# Patient Record
Sex: FEMALE | Race: WHITE | NOT HISPANIC OR LATINO | URBAN - METROPOLITAN AREA
[De-identification: names, ages, dates, MRNs, and addresses within clinical notes are randomized per-mention and may not be internally consistent; named-entity substitution may affect disease eponyms.]

---

## 2017-10-15 ENCOUNTER — INPATIENT (INPATIENT)
Facility: HOSPITAL | Age: 82
LOS: 8 days | Discharge: HOME | DRG: 85 | End: 2017-10-24
Attending: HOSPITALIST | Admitting: HOSPITALIST
Payer: MEDICARE

## 2017-10-15 VITALS
HEART RATE: 80 BPM | SYSTOLIC BLOOD PRESSURE: 140 MMHG | TEMPERATURE: 98 F | OXYGEN SATURATION: 99 % | RESPIRATION RATE: 20 BRPM | DIASTOLIC BLOOD PRESSURE: 69 MMHG

## 2017-10-15 LAB
ALBUMIN SERPL ELPH-MCNC: 4 G/DL — SIGNIFICANT CHANGE UP (ref 3.3–5)
ALP SERPL-CCNC: 76 U/L — SIGNIFICANT CHANGE UP (ref 40–120)
ALT FLD-CCNC: 14 U/L RC — SIGNIFICANT CHANGE UP (ref 10–45)
ANION GAP SERPL CALC-SCNC: 14 MMOL/L — SIGNIFICANT CHANGE UP (ref 5–17)
APTT BLD: 29.1 SEC — SIGNIFICANT CHANGE UP (ref 27.5–37.4)
AST SERPL-CCNC: 26 U/L — SIGNIFICANT CHANGE UP (ref 10–40)
BASOPHILS # BLD AUTO: 0 K/UL — SIGNIFICANT CHANGE UP (ref 0–0.2)
BASOPHILS NFR BLD AUTO: 0.5 % — SIGNIFICANT CHANGE UP (ref 0–2)
BILIRUB SERPL-MCNC: 0.2 MG/DL — SIGNIFICANT CHANGE UP (ref 0.2–1.2)
BUN SERPL-MCNC: 21 MG/DL — SIGNIFICANT CHANGE UP (ref 7–23)
CALCIUM SERPL-MCNC: 9.3 MG/DL — SIGNIFICANT CHANGE UP (ref 8.4–10.5)
CHLORIDE SERPL-SCNC: 99 MMOL/L — SIGNIFICANT CHANGE UP (ref 96–108)
CO2 SERPL-SCNC: 23 MMOL/L — SIGNIFICANT CHANGE UP (ref 22–31)
CREAT SERPL-MCNC: 0.79 MG/DL — SIGNIFICANT CHANGE UP (ref 0.5–1.3)
EOSINOPHIL # BLD AUTO: 0.2 K/UL — SIGNIFICANT CHANGE UP (ref 0–0.5)
EOSINOPHIL NFR BLD AUTO: 2.6 % — SIGNIFICANT CHANGE UP (ref 0–6)
GLUCOSE SERPL-MCNC: 128 MG/DL — HIGH (ref 70–99)
HCT VFR BLD CALC: 38.5 % — SIGNIFICANT CHANGE UP (ref 34.5–45)
HGB BLD-MCNC: 12.8 G/DL — SIGNIFICANT CHANGE UP (ref 11.5–15.5)
INR BLD: 1.07 RATIO — SIGNIFICANT CHANGE UP (ref 0.88–1.16)
LYMPHOCYTES # BLD AUTO: 1.6 K/UL — SIGNIFICANT CHANGE UP (ref 1–3.3)
LYMPHOCYTES # BLD AUTO: 24.6 % — SIGNIFICANT CHANGE UP (ref 13–44)
MCHC RBC-ENTMCNC: 33.3 GM/DL — SIGNIFICANT CHANGE UP (ref 32–36)
MCHC RBC-ENTMCNC: 34.6 PG — HIGH (ref 27–34)
MCV RBC AUTO: 104 FL — HIGH (ref 80–100)
MONOCYTES # BLD AUTO: 0.7 K/UL — SIGNIFICANT CHANGE UP (ref 0–0.9)
MONOCYTES NFR BLD AUTO: 10.6 % — SIGNIFICANT CHANGE UP (ref 2–14)
NEUTROPHILS # BLD AUTO: 4.1 K/UL — SIGNIFICANT CHANGE UP (ref 1.8–7.4)
NEUTROPHILS NFR BLD AUTO: 61.7 % — SIGNIFICANT CHANGE UP (ref 43–77)
PLATELET # BLD AUTO: 196 K/UL — SIGNIFICANT CHANGE UP (ref 150–400)
POTASSIUM SERPL-MCNC: 4 MMOL/L — SIGNIFICANT CHANGE UP (ref 3.5–5.3)
POTASSIUM SERPL-SCNC: 4 MMOL/L — SIGNIFICANT CHANGE UP (ref 3.5–5.3)
PROT SERPL-MCNC: 7.4 G/DL — SIGNIFICANT CHANGE UP (ref 6–8.3)
PROTHROM AB SERPL-ACNC: 11.6 SEC — SIGNIFICANT CHANGE UP (ref 9.8–12.7)
RBC # BLD: 3.7 M/UL — LOW (ref 3.8–5.2)
RBC # FLD: 12.2 % — SIGNIFICANT CHANGE UP (ref 10.3–14.5)
SODIUM SERPL-SCNC: 136 MMOL/L — SIGNIFICANT CHANGE UP (ref 135–145)
WBC # BLD: 6.7 K/UL — SIGNIFICANT CHANGE UP (ref 3.8–10.5)
WBC # FLD AUTO: 6.7 K/UL — SIGNIFICANT CHANGE UP (ref 3.8–10.5)

## 2017-10-15 PROCEDURE — 71010: CPT | Mod: 26

## 2017-10-15 PROCEDURE — 72125 CT NECK SPINE W/O DYE: CPT | Mod: 26

## 2017-10-15 PROCEDURE — 72170 X-RAY EXAM OF PELVIS: CPT | Mod: 26

## 2017-10-15 PROCEDURE — 70486 CT MAXILLOFACIAL W/O DYE: CPT | Mod: 26

## 2017-10-15 PROCEDURE — 99285 EMERGENCY DEPT VISIT HI MDM: CPT

## 2017-10-15 PROCEDURE — 70450 CT HEAD/BRAIN W/O DYE: CPT | Mod: 26

## 2017-10-15 PROCEDURE — 73030 X-RAY EXAM OF SHOULDER: CPT | Mod: 26,RT

## 2017-10-15 RX ORDER — ACETAMINOPHEN 500 MG
1000 TABLET ORAL ONCE
Qty: 0 | Refills: 0 | Status: COMPLETED | OUTPATIENT
Start: 2017-10-15 | End: 2017-10-15

## 2017-10-15 RX ORDER — TETANUS TOXOID, REDUCED DIPHTHERIA TOXOID AND ACELLULAR PERTUSSIS VACCINE, ADSORBED 5; 2.5; 8; 8; 2.5 [IU]/.5ML; [IU]/.5ML; UG/.5ML; UG/.5ML; UG/.5ML
0.5 SUSPENSION INTRAMUSCULAR ONCE
Qty: 0 | Refills: 0 | Status: COMPLETED | OUTPATIENT
Start: 2017-10-15 | End: 2017-10-15

## 2017-10-15 RX ORDER — OXYMETAZOLINE HYDROCHLORIDE 0.5 MG/ML
2 SPRAY NASAL ONCE
Qty: 0 | Refills: 0 | Status: COMPLETED | OUTPATIENT
Start: 2017-10-15 | End: 2017-10-15

## 2017-10-15 RX ADMIN — Medication 400 MILLIGRAM(S): at 18:56

## 2017-10-15 RX ADMIN — OXYMETAZOLINE HYDROCHLORIDE 2 SPRAY(S): 0.5 SPRAY NASAL at 16:20

## 2017-10-15 RX ADMIN — TETANUS TOXOID, REDUCED DIPHTHERIA TOXOID AND ACELLULAR PERTUSSIS VACCINE, ADSORBED 0.5 MILLILITER(S): 5; 2.5; 8; 8; 2.5 SUSPENSION INTRAMUSCULAR at 17:33

## 2017-10-15 NOTE — ED ADULT NURSE REASSESSMENT NOTE - NS ED NURSE REASSESS COMMENT FT1
pt ambulated to bathroom with assistance. no distress.
pt offered sling for R arm and placed by PA. pt repeatedly removing sling. educated on need and comfort care provided with sling. pt and family state understanding but state the sling is on the pts neck and makes pt uncomfortable. family currently going home.

## 2017-10-15 NOTE — ED PROVIDER NOTE - ATTENDING CONTRIBUTION TO CARE
89 y/o f with pmhx dementia, presents from [facility] for a fall earlier this evening. Witnessed by multiple people there. no LOC. Hit her head on right side. Also has pain on right shoulder.  no dizziness. no weakness.  Came by EMS, daughter arrived shortly after.   Gen. no acute distress, Non toxic   HEENT: EOMI, mmm, laceration approx 1.5 cm over right eye, bleeding from left nares.   Lungs: CTAB/L no C/ W /R   CVS: S1S2   Abd; Soft non tender, non distended   Ext: no edema   Neuro AAOx3 non focal clear speech

## 2017-10-15 NOTE — ED PROVIDER NOTE - MEDICAL DECISION MAKING DETAILS
AT[facility]: fall with head trauma, no loc,. concern for ich, fracture, check ct head ./ facial, check labs, re eval for dispo,.

## 2017-10-15 NOTE — ED PROVIDER NOTE - OBJECTIVE STATEMENT
89 y/o [facility] after a witnessed mechanical fall with headstrike. no loc. complaining of epistaxis. reports right shoulder pain. 89 y/o coming in [facility] after a witnessed mechanical fall with headstrike. no loc. complaining of epistaxis. reports right shoulder pain. on aspirin 81 mg daily. unsure of last tdap

## 2017-10-15 NOTE — CONSULT NOTE ADULT - ASSESSMENT
89F s/p mechanical fall with trace right insular traumatic SAH    -No acute neurosurgical intervention indicated at this time  -Pain control  -Repeat CT head at 10:00pm  -If stable or minimally changed, may follow up as outpatient with Dr. Stone in 2 weeks

## 2017-10-15 NOTE — CONSULT NOTE ADULT - SUBJECTIVE AND OBJECTIVE BOX
HPI: 89F with history of dementia s/p mechanical fall today. Patient does not recall details of the events but no LOC. She was brought to hospital where CT head revealed possible right insular traumatic SAH. She denies headache, but states that her head feels uncomfortable. She has head wrap on for scalp laceration. No nausea/vomiting, weakness, numbness, tingling. No blood thinner use.    PAST MEDICAL HISTORY   No pertinent past medical history    PAST SURGICAL HISTORY   No significant past surgical history    penicillin G benzathine (Unknown)      MEDICATIONS:  Antibiotics:    Neuro:    Anticoagulation: denies    Other:      SOCIAL HISTORY:   Occupation:   Marital Status:     FAMILY HISTORY:  No pertinent family history in first degree relatives      REVIEW OF SYSTEMS:  Check here if all are normal other than Neurological [x]  General:  Eyes:  ENT:  Cardiac:  Respiratory:  GI:  Musculoskeletal:   Skin:  Neurologic:   Psychiatric:     PHYSICAL EXAMINATION:   T(C): 36.6 (10-15-17 @ 18:45), Max: 36.7 (10-15-17 @ 17:04)  HR: 76 (10-15-17 @ 18:45) (76 - 80)  BP: 133/56 (10-15-17 @ 18:45) (133/56 - 140/69)  RR: 16 (10-15-17 @ 18:45) (16 - 20)  SpO2: 100% (10-15-17 @ 18:45) (99% - 100%)  Wt(kg): --    AOx2-3, Following Commands    CN: PERRL, EOMI, V1-3 intact, no facial droop appreciated, palate elevation symmetric, tongue midline, shoulder shrug 5/5    Motor: 5/5 throughout, no drift    Sensation: intact to light touch    Reflexes: No clonus or babinski    Gait: not assessed     LABS:                        12.8   6.7   )-----------( 196      ( 15 Oct 2017 17:03 )             38.5     10-15    136  |  99  |  21  ----------------------------<  128<H>  4.0   |  23  |  0.79    Ca    9.3      15 Oct 2017 17:03    TPro  7.4  /  Alb  4.0  /  TBili  0.2  /  DBili  x   /  AST  26  /  ALT  14  /  AlkPhos  76  10-15    PT/INR - ( 15 Oct 2017 17:03 )   PT: 11.6 sec;   INR: 1.07 ratio         PTT - ( 15 Oct 2017 17:03 )  PTT:29.1 sec      Pager: [phone]

## 2017-10-15 NOTE — ED PROVIDER NOTE - PROGRESS NOTE DETAILS
worsening eyelid swelling and subconjunctival hemorrage, EOMI Rigoberto Hudson DO: received sign out from dr klein. CT repeated and no ICH. pt xray reviewed with prior xrays shoulder - no change. no acute fx, pt now with full ROM. Her right eye is swollen now, howevere, and she is unsteady on her feet. She states she lives by herself in assistive living facility and does not think she could care for herself alone tonight, attempted to reach her daughters via phone numbers provided in chart, but unable to. will admit for unsafe dc, PT/OT eval.

## 2017-10-15 NOTE — ED ADULT NURSE NOTE - OBJECTIVE STATEMENT
pt is a 89 yr F s/p mechanical fall, pt states she tripped over a liana mat, fell onto face, tried to brace herself on hands and struck R knee. pt has laceration over R eye, +swelling and bruising to R eye. pt has skin tear to R upper arm, +bruising. pt denies LOC, +epistaxis from L nostril, blood noted in mouth, airway intact.  pt a&ox3, follows commands, moves all extremities. no other deformities noted.

## 2017-10-15 NOTE — ED PROCEDURE NOTE - CPROC ED POST PROC CARE GUIDE1
Instructed patient/caregiver to follow-up with primary care physician./Verbal/written post procedure instructions were given to patient/caregiver./Elevate the injured extremity as instructed./Instructed patient/caregiver regarding signs and symptoms of infection.
Instructed patient/caregiver to follow-up with primary care physician./Verbal/written post procedure instructions were given to patient/caregiver./Keep the cast/splint/dressing clean and dry.

## 2017-10-15 NOTE — ED PROVIDER NOTE - PLAN OF CARE
Follow up with your Primary Care Physician within the next 2-3 days  Bring a copy of your test results with you to your appointment  Continue your current medication regimen  Return to the Emergency Room if you experience new or worsening symptoms  Follow up with Neurosurgery Dr Stone [phone]  return to the ED for suture removal in 5 days  Follow up with orthopedics regarding your humeral head fracture  Dr Elias [phone]

## 2017-10-16 DIAGNOSIS — S02.40DA MAXILLARY FRACTURE, LEFT SIDE, INITIAL ENCOUNTER FOR CLOSED FRACTURE: ICD-10-CM
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
## 2017-10-16 DIAGNOSIS — I60.9 NONTRAUMATIC SUBARACHNOID HEMORRHAGE, UNSPECIFIED: ICD-10-CM

## 2017-10-16 DIAGNOSIS — S02.80XA FRACTURE OF OTHER SPECIFIED SKULL AND FACIAL BONES, UNSPECIFIED SIDE, INITIAL ENCOUNTER FOR CLOSED FRACTURE: ICD-10-CM

## 2017-10-16 DIAGNOSIS — S01.01XA LACERATION WITHOUT FOREIGN BODY OF SCALP, INITIAL ENCOUNTER: ICD-10-CM

## 2017-10-16 DIAGNOSIS — F03.90 UNSPECIFIED DEMENTIA, UNSPECIFIED SEVERITY, WITHOUT BEHAVIORAL DISTURBANCE, PSYCHOTIC DISTURBANCE, MOOD DISTURBANCE, AND ANXIETY: ICD-10-CM

## 2017-10-16 DIAGNOSIS — Z29.9 ENCOUNTER FOR PROPHYLACTIC MEASURES, UNSPECIFIED: ICD-10-CM

## 2017-10-16 DIAGNOSIS — R04.0 EPISTAXIS: ICD-10-CM

## 2017-10-16 DIAGNOSIS — H11.31 CONJUNCTIVAL HEMORRHAGE, RIGHT EYE: ICD-10-CM

## 2017-10-16 DIAGNOSIS — S42.291A OTHER DISPLACED FRACTURE OF UPPER END OF RIGHT HUMERUS, INITIAL ENCOUNTER FOR CLOSED FRACTURE: ICD-10-CM

## 2017-10-16 LAB
ANION GAP SERPL CALC-SCNC: 13 MMOL/L — SIGNIFICANT CHANGE UP (ref 5–17)
BLD GP AB SCN SERPL QL: NEGATIVE — SIGNIFICANT CHANGE UP
BUN SERPL-MCNC: 18 MG/DL — SIGNIFICANT CHANGE UP (ref 7–23)
CALCIUM SERPL-MCNC: 8.5 MG/DL — SIGNIFICANT CHANGE UP (ref 8.4–10.5)
CHLORIDE SERPL-SCNC: 100 MMOL/L — SIGNIFICANT CHANGE UP (ref 96–108)
CO2 SERPL-SCNC: 22 MMOL/L — SIGNIFICANT CHANGE UP (ref 22–31)
CREAT SERPL-MCNC: 0.57 MG/DL — SIGNIFICANT CHANGE UP (ref 0.5–1.3)
GLUCOSE BLDC GLUCOMTR-MCNC: 144 MG/DL — HIGH (ref 70–99)
GLUCOSE SERPL-MCNC: 121 MG/DL — HIGH (ref 70–99)
HCT VFR BLD CALC: 33.3 % — LOW (ref 34.5–45)
HGB BLD-MCNC: 10.9 G/DL — LOW (ref 11.5–15.5)
MCHC RBC-ENTMCNC: 32.6 GM/DL — SIGNIFICANT CHANGE UP (ref 32–36)
MCHC RBC-ENTMCNC: 33.5 PG — SIGNIFICANT CHANGE UP (ref 27–34)
MCV RBC AUTO: 103 FL — HIGH (ref 80–100)
PLATELET # BLD AUTO: 198 K/UL — SIGNIFICANT CHANGE UP (ref 150–400)
POTASSIUM SERPL-MCNC: 3.7 MMOL/L — SIGNIFICANT CHANGE UP (ref 3.5–5.3)
POTASSIUM SERPL-SCNC: 3.7 MMOL/L — SIGNIFICANT CHANGE UP (ref 3.5–5.3)
RBC # BLD: 3.24 M/UL — LOW (ref 3.8–5.2)
RBC # FLD: 12 % — SIGNIFICANT CHANGE UP (ref 10.3–14.5)
RH IG SCN BLD-IMP: POSITIVE — SIGNIFICANT CHANGE UP
SODIUM SERPL-SCNC: 135 MMOL/L — SIGNIFICANT CHANGE UP (ref 135–145)
VIT B12 SERPL-MCNC: 899 PG/ML — HIGH (ref 243–894)
WBC # BLD: 7.4 K/UL — SIGNIFICANT CHANGE UP (ref 3.8–10.5)
WBC # FLD AUTO: 7.4 K/UL — SIGNIFICANT CHANGE UP (ref 3.8–10.5)

## 2017-10-16 PROCEDURE — 99223 1ST HOSP IP/OBS HIGH 75: CPT

## 2017-10-16 PROCEDURE — 99221 1ST HOSP IP/OBS SF/LOW 40: CPT

## 2017-10-16 PROCEDURE — 93010 ELECTROCARDIOGRAM REPORT: CPT

## 2017-10-16 PROCEDURE — 70450 CT HEAD/BRAIN W/O DYE: CPT | Mod: 26

## 2017-10-16 RX ORDER — OXYCODONE HYDROCHLORIDE 5 MG/1
5 TABLET ORAL EVERY 6 HOURS
Qty: 0 | Refills: 0 | Status: DISCONTINUED | OUTPATIENT
Start: 2017-10-16 | End: 2017-10-16

## 2017-10-16 RX ORDER — SODIUM CHLORIDE 0.65 %
1 AEROSOL, SPRAY (ML) NASAL THREE TIMES A DAY
Qty: 0 | Refills: 0 | Status: DISCONTINUED | OUTPATIENT
Start: 2017-10-16 | End: 2017-10-24

## 2017-10-16 RX ORDER — AZITHROMYCIN 500 MG/1
250 TABLET, FILM COATED ORAL DAILY
Qty: 0 | Refills: 0 | Status: DISCONTINUED | OUTPATIENT
Start: 2017-10-16 | End: 2017-10-18

## 2017-10-16 RX ORDER — PETROLATUM,WHITE
1 JELLY (GRAM) TOPICAL
Qty: 0 | Refills: 0 | Status: DISCONTINUED | OUTPATIENT
Start: 2017-10-16 | End: 2017-10-24

## 2017-10-16 RX ORDER — ACETAMINOPHEN 500 MG
650 TABLET ORAL EVERY 6 HOURS
Qty: 0 | Refills: 0 | Status: DISCONTINUED | OUTPATIENT
Start: 2017-10-16 | End: 2017-10-24

## 2017-10-16 RX ORDER — SODIUM CHLORIDE 9 MG/ML
500 INJECTION INTRAMUSCULAR; INTRAVENOUS; SUBCUTANEOUS ONCE
Qty: 0 | Refills: 0 | Status: COMPLETED | OUTPATIENT
Start: 2017-10-16 | End: 2017-10-16

## 2017-10-16 RX ORDER — BACITRACIN ZINC 500 UNIT/G
1 OINTMENT IN PACKET (EA) TOPICAL
Qty: 0 | Refills: 0 | Status: DISCONTINUED | OUTPATIENT
Start: 2017-10-16 | End: 2017-10-24

## 2017-10-16 RX ADMIN — Medication 650 MILLIGRAM(S): at 18:30

## 2017-10-16 RX ADMIN — Medication 1 DROP(S): at 22:05

## 2017-10-16 RX ADMIN — Medication 650 MILLIGRAM(S): at 18:04

## 2017-10-16 RX ADMIN — AZITHROMYCIN 250 MILLIGRAM(S): 500 TABLET, FILM COATED ORAL at 18:03

## 2017-10-16 RX ADMIN — SODIUM CHLORIDE 250 MILLILITER(S): 9 INJECTION INTRAMUSCULAR; INTRAVENOUS; SUBCUTANEOUS at 02:59

## 2017-10-16 RX ADMIN — Medication 1 APPLICATION(S): at 22:05

## 2017-10-16 NOTE — PROGRESS NOTE ADULT - PROBLEM SELECTOR PLAN 1
-discussed case personally with neurosurgery, at this time no intervention.  -will discuss with attending (spoke with R1 NSx) re: further imaging if necessary  -continue with q4 hour neurochecks  -follow up neurosurgical recs.

## 2017-10-16 NOTE — CONSULT NOTE ADULT - ASSESSMENT
A/P: 89 F with multiple traumatic injuries s/p witnessed mechanical fall. CT significant for orbital roof and medial wall fracture OD with hematoma of superior rectus muscle. EOMs intact. IOP wnl. Globes intact. VA equal in both eyes. Subconjunctival hemorrhage.   - abx as per primary  - avoid nose blowing  - ice packs q1-2 hr OD while awake  - facial plastics consult  - appreciate NSGY recs  - artificial tears OU      Follow-Up:  Patient should follow up his/her ophthalmologist or in the [facility] within 1 week of discharge.  [address] [place] [address].  [place] [address] [phone]    S/D/W Dr Combs (attending) A/P: 89 F with multiple traumatic injuries s/p witnessed mechanical fall. CT significant for orbital roof and medial wall fracture OD with hematoma of superior rectus muscle. EOMs intact. IOP wnl. Globes intact. VA equal in both eyes. Subconjunctival hemorrhage.   - abx as per primary  - avoid nose blowing  - ice packs q1-2 hr OD while awake for the first 48 hours  - facial plastics consult  - appreciate NSGY recs  - artificial tears OU      Follow-Up:  Patient should follow up his/her ophthalmologist or in the [facility] within 1 week of discharge, sooner if symptoms worsen or change.  [address] [facility] [place] [address] [phone]    S/D/W Dr Combs (attending)

## 2017-10-16 NOTE — CONSULT NOTE ADULT - ASSESSMENT
ASSESSMENT: Gris Dixon is a 89 y.o. woman who's only significant history is for dementia who presented to the ED on 10/15 with a traumatic, right frontal SAH and right superior and medial orbital wall fractures. At this time, EOMI bilaterally, visual acuity is intact, and cranial nerves are intact. Given the lack of symptoms and non-displaced nature of orbital fractures, non-operative management is indicated.     PLAN:   - SAH care per neurosurgery  - Will follow up on CT head planned for 10/17  - Assess CN with neuro checks ASSESSMENT: Gris Dixon is a 89 y.o. woman who's only significant history is for dementia who presented to the ED on 10/15 with a traumatic, right frontal SAH and right superior and medial orbital wall fractures. Given the lack of changes to visual acuity and extra-ocular muscle entrapment, low concern for retrobulbar hematoma, and minimal displacement of fractured bones, there is no indication for operative intervention.     PLAN:   - SAH care per neurosurgery;   - Epistaxis care per ENT;  - Recommend ophthalmology consultation for formal exam of visual acuity and orbital pressures.  - Head elevation, ice for edema/ecchymosis.   - No operative intervention at this time.

## 2017-10-16 NOTE — H&P ADULT - PROBLEM SELECTOR PLAN 4
Possibly artifact as repeat imaging bleeding has resolved. Neurosurgery recs appreciated. Neurosurgery Dr Stone [phone] follow up to be called.

## 2017-10-16 NOTE — PROGRESS NOTE ADULT - PROBLEM SELECTOR PLAN 1
Start/Continue ABX for packing prophylaxis  Nasal Saline b/l 3x/day  Bacitracin Ointment B/L 2x/day  Avoid nasal trauma  Consider Hand Mitts as patient pulls out packing ( d/w Med Team)  Care per Med Team Start/Continue ABX for packing prophylaxis  Nasal Saline b/l 3x/day  Bacitracin Ointment B/L 2x/day  Avoid nasal trauma  Monitor H/H transfuse prn  Consider Hand Mitts as patient pulls out packing ( d/w Med Team)  Care per Med Team

## 2017-10-16 NOTE — H&P ADULT - NSHPPHYSICALEXAM_GEN_ALL_CORE
Vital Signs Last 24 Hrs  T(C): 36.6 (10-16-17 @ 00:11), Max: 36.7 (10-15-17 @ 17:04)  T(F): 97.9 (10-16-17 @ 00:11), Max: 98 (10-15-17 @ 17:04)  HR: 72 (10-16-17 @ 00:16) (72 - 80)  BP: 129/57 (10-16-17 @ 00:16) (86/65 - 140/69)  BP(mean): --  RR: 18 (10-16-17 @ 00:16) (16 - 20)  SpO2: 100% (10-16-17 @ 00:16) (99% - 100%)

## 2017-10-16 NOTE — PROGRESS NOTE ADULT - SUBJECTIVE AND OBJECTIVE BOX
POD/STATUS POST/ENT ISSUE:  called to re-eval patient, as she pulled out her packing    INTERVAL HPI:     Vital Signs Last 24 Hrs  T(C): 36.6 (16 Oct 2017 00:11), Max: 36.7 (15 Oct 2017 17:04)  T(F): 97.9 (16 Oct 2017 00:11), Max: 98 (15 Oct 2017 17:04)  HR: 66 (16 Oct 2017 02:30) (66 - 80)  BP: 118/55 (16 Oct 2017 02:30) (86/65 - 140/69)  RR: 18 (16 Oct 2017 02:30) (16 - 20)  SpO2: 97% (16 Oct 2017 02:30) (97% - 100%)    PHYSICAL EXAM:  Gen: NAD, well-developed  Head: Normocephalic, Atraumatic  Eyes:  Right Eye- ecchymosis PERRL, EOMI, no scleral injection    Nose: Nares bilaterally patent, no discharge  Mouth: Mucosa moist, tongue/uvula midline, oropharynx clear  Neck: Flat, supple, no lymphadenopathy, trachea midline, no masses  Resp: breathing easily, no stridor  CV: no peripheral edema/cyanosis    LABS:                        10.9   7.4   )-----------( 198      ( 16 Oct 2017 04:03 )             33.3       IMAGING/ADDITIONAL STUDIES: POD/STATUS POST/ENT ISSUE:  called to re-eval patient for epistaxis, as she pulled out her packing    INTERVAL HPI: 89F w/ dementia, on ASA s/p witnessed mechanical fall, hit Right side of head we were called to evaluate patient for epistaxis, Gel Foam packing was placed in Left nostril with control of bleeding, pt pulled out packing and has a moderate amount of bleeding from left nostril, spit up some clots       Vital Signs Last 24 Hrs  T(C): 36.6 (16 Oct 2017 00:11), Max: 36.7 (15 Oct 2017 17:04)  T(F): 97.9 (16 Oct 2017 00:11), Max: 98 (15 Oct 2017 17:04)  HR: 66 (16 Oct 2017 02:30) (66 - 80)  BP: 118/55 (16 Oct 2017 02:30) (86/65 - 140/69)  RR: 18 (16 Oct 2017 02:30) (16 - 20)  SpO2: 97% (16 Oct 2017 02:30) (97% - 100%)      PHYSICAL EXAM:  Gen: NAD, well-developed alert to baseline  Head: Normocephalic, Atraumatic  Eyes:  Right Eye-  Periorbital edema and ecchymosis, unable to open eye Left Eye, EOMI, no scleral injection  Nose:  Left nare with moderate bleeding, suctioned blood clots, Rapid Rhino ( 4.5) placed inflated with Normal Saline, surgicel placed anteriorly bleeding controlled   Mouth: Mucosa moist, tongue/uvula midline, oropharynx with residual blood in Posterior OP  Neck: Flat, supple, no lymphadenopathy, trachea midline, no masses  Resp: breathing easily, no stridor      LABS:                        10.9   7.4   )-----------( 198      ( 16 Oct 2017 04:03 )             33.3

## 2017-10-16 NOTE — PROGRESS NOTE ADULT - ASSESSMENT
89 yo female PMHx Dementia (per dajennye ris not on ASA) presents s/p mechanical fall c/b orbital and maxillary fractures as well as SAH.

## 2017-10-16 NOTE — CONSULT NOTE ADULT - CONSULT REASON
Epistaxis
orbital fracture
s/p witnessed fall
traumatic SAH
Right Superior and Medial Orbital Wall Fractures

## 2017-10-16 NOTE — CONSULT NOTE ADULT - ASSESSMENT
89yF s/p witnessed mechanical fall w/ traumatic SAH    - SAH care per neurosurg  - recommend facial fracture consult for R orbit fracture  - no additional imaging required  - tertiary survey complete, no other evidence of acute traumatic injury  - plan discussed w/ Dr. Barraza  - please page [phone] w/ questions

## 2017-10-16 NOTE — PROGRESS NOTE ADULT - SUBJECTIVE AND OBJECTIVE BOX
Authored by Endy Lugo, DO: Beeper#[phone]    REYNA CLAUDIO  89y  Female      Patient is a 89y old  Female who presents with a chief complaint of Fall and multiple injuries (16 Oct 2017 00:18)      INTERVAL HPI/OVERNIGHT EVENTS:  Patient with RRT overnight for episaxis and another RRT when had syncopal episode after pulling out nasal packing.  Currently patiernt is AAOx1, able to answer questions re: pain,limited exam 2/2 patinet refusing to be examined.  Patient states no pain.  No blurry vision.  No headache.  No chest pain, SOB.  Does not remember episode of fall.  Does know know why she is in hospital.         T(C): 36.6 (10-16-17 @ 00:11), Max: 36.7 (10-15-17 @ 17:04)  HR: 84 (10-16-17 @ 09:00) (66 - 84)  BP: 122/64 (10-16-17 @ 09:00) (86/65 - 140/69)  RR: 18 (10-16-17 @ 09:00) (16 - 20)  SpO2: 86% (10-16-17 @ 09:00) (86% - 100%)  Wt(kg): --Vital Signs Last 24 Hrs  T(C): 36.6 (16 Oct 2017 00:11), Max: 36.7 (15 Oct 2017 17:04)  T(F): 97.9 (16 Oct 2017 00:11), Max: 98 (15 Oct 2017 17:04)  HR: 84 (16 Oct 2017 09:00) (66 - 84)  BP: 122/64 (16 Oct 2017 09:00) (86/65 - 140/69)  BP(mean): --  RR: 18 (16 Oct 2017 09:00) (16 - 20)  SpO2: 86% (16 Oct 2017 09:00) (86% - 100%)    PHYSICAL EXAM:  GENERAL: NAD  HEENT: right eye soft tissue swelled shut with right sided facial swelling and ecchymosis.  Patient did not allow for me to perform extra-ocular movementrs or pupillary reflex at time of exam.    CARDS: +S1S2. rrr.  No m/r/g  RESP: CTA b/l.  No r/r/w  ABD: Soft.  NT/ND.  +BS  EXT: No c/c/e.  movign b/l upper extemiteis spontaneously.     Consultant(s) Notes Reviewed:  [x ] YES  [ ] NO  Care Discussed with Consultants/Other Providers [ x] YES  [ ] NO: Neuroradiology (Dr. White), Trauma resident, Neurosurgical resident    LABS:                        10.9   7.4   )-----------( 198      ( 16 Oct 2017 04:03 )             33.3     10-16    135  |  100  |  18  ----------------------------<  121<H>  3.7   |  22  |  0.57    Ca    8.5      16 Oct 2017 04:07    TPro  7.4  /  Alb  4.0  /  TBili  0.2  /  DBili  x   /  AST  26  /  ALT  14  /  AlkPhos  76  10-15    PT/INR - ( 15 Oct 2017 17:03 )   PT: 11.6 sec;   INR: 1.07 ratio         PTT - ( 15 Oct 2017 17:03 )  PTT:29.1 sec    CAPILLARY BLOOD GLUCOSE      POCT Blood Glucose.: 144 mg/dL (16 Oct 2017 06:55)            RADIOLOGY & ADDITIONAL TESTS:    Imaging Personally Reviewed:  [ ] YES  [ ] NO

## 2017-10-16 NOTE — CONSULT NOTE ADULT - SUBJECTIVE AND OBJECTIVE BOX
Level 3 Trauma Activation    CC: Patient is a 89y old  Female who presents with a chief complaint of Fall and multiple injuries (16 Oct 2017 00:18)        HPI:  89F w/ dementia (AAOx1 at baseline), on ASA p/w fall and multiple traumatic injuries. Pt states she was walking around in [facility] to the food area and tripped on the carpet. She hit the right side of her head and torso when she fell. Reportedly this fall was witnessed by many people around her. Pt denies any loss of consciousness or any other subjective complaints during this event. She currently lives at [facility]. As per ER notes, daughter came to bedside at some point during ER visit. Pt states her daughter plans to take her home in the morning. Pt denies chest pain, SOB, palpitations. Pain in R arm is currently limited.     In ER: Given Tylenol 1gm, Tdap, Afrin (16 Oct 2017 00:18)    Pt had questionable syncopal vs. seizure like activity this AM while in bathroom. RRT called. Pt w/o postictal state immediately after. Pt denies C-spine, chest, abdominal, pelvic, or extremity pain.       Physical Exam:  Vital Signs Last 24 Hrs  T(C): 36.6 (16 Oct 2017 00:11), Max: 36.7 (15 Oct 2017 17:04)  T(F): 97.9 (16 Oct 2017 00:11), Max: 98 (15 Oct 2017 17:04)  HR: 66 (16 Oct 2017 02:30) (66 - 80)  BP: 118/55 (16 Oct 2017 02:30) (86/65 - 140/69)  BP(mean): --  RR: 18 (16 Oct 2017 02:30) (16 - 20)  SpO2: 97% (16 Oct 2017 02:30) (97% - 100%)    gen: NAD  HEENT: 1.5cm laceration to R frontal scalp s/p suture, R periorbital ecchymosis, EOMI, PERRLA  CV: s1, s2, RRR  Pulm: CTA B/L  Chest: No TTP  Abd: Soft, non- distended, Non tender to palpation, no rebound, no guarding  Ext: palp radial b/l UE, DP palp b/l LE  Back: no TTP, no palpable runoff/stepoff/deformity  Neuro: AAO to person (knew she was in a hospital, not oriented to specific place or date)    PMH  Dementia    PSH  No significant past surgical history    MEDS    Allergies    penicillin G benzathine (Unknown)    Intolerances        Social    Labs:                        10.9   7.4   )-----------( 198      ( 16 Oct 2017 04:03 )             33.3     10-16    135  |  100  |  18  ----------------------------<  121<H>  3.7   |  22  |  0.57    Ca    8.5      16 Oct 2017 04:07    TPro  7.4  /  Alb  4.0  /  TBili  0.2  /  DBili  x   /  AST  26  /  ALT  14  /  AlkPhos  76  10-15    PT/INR - ( 15 Oct 2017 17:03 )   PT: 11.6 sec;   INR: 1.07 ratio         PTT - ( 15 Oct 2017 17:03 )  PTT:29.1 sec      Imaging  < from: CT Head No Cont (10.16.17 @ 08:15) >  FINDINGS:     There are new thin linear hyperdensities within the sulci of the right   parietotemporal region compatible with subarachnoid hemorrhage.     There is an acute minimally displaced fracture involving the superior   roof and medial wall of the right orbit. There is an associated hematoma   along the right superior rectus muscle. There is an air-fluid level in   the right maxillary sinus which is similar appearance to the prior exam.    There is no other evidence of intracranial hemorrhage.    There are scattered confluent white matter hypodensities compatible with   chronic microvascular-type change.    There is prominence of the ventricles compatible with chronic   involutional changes. There is bifrontal cerebral atrophy.     There is a nasal tube within the left nasal cavity.     There is bilateral sinus disease as described in detail on the recent   maxillofacial CT from October 15, 2017.    The mastoid air cells and middle ear cavities are grossly clear.    The right frontal and periorbital scalp soft tissue swelling is similar   appearance to the prior study.     There is no displaced calvarial fracture.     < end of copied text >    < from: Xray Chest 1 View AP/PA (10.15.17 @ 18:16) >  FINDINGS:    Bilateral patchy opacities in the right lower lobe and right upper lobe.  There is no pneumothorax or pleural effusions.   The cardiomediastinal silhouette cannot be adequately assessed on this   projection.  Redemonstration of prior healed left posterior rib fractures.    < end of copied text >    < from: Xray Pelvis AP only (10.15.17 @ 18:16) >  FINDINGS:   Pubic symphysisarthrosis and chondrocalcinosis.  Degenerative changes, including marginal osteophyte formation, of the   bilateral hip joints.  No evidence of any acute displaced fracture of the pelvis or bilateral   hips.  Evaluation of hip fractures limited by single frontal projection.  No gross soft tissue defects.   There is calcific body identified medial to the right lesser trochanter,   may represent tendon enthesopathy.    < end of copied text >

## 2017-10-16 NOTE — H&P ADULT - PROBLEM SELECTOR PLAN 1
As per ER, needs appt with Dr Elias [phone] orthopedics after discharge. Pt with limited pain at the moment  -Will consult ortho in AM  -Pain control with tylenol and oxy PRN moderate pain As per ER, needs appt with Dr Elias [phone] orthopedics after discharge. Pt with limited pain at the moment  -Impacted non-displaced humeral fracture on wet read, needs sling and outpatient ortho follow up.  -Pain control with tylenol and oxy PRN moderate pain

## 2017-10-16 NOTE — CONSULT NOTE ADULT - PROBLEM SELECTOR RECOMMENDATION 9
Nasal saline 2 sprays to B/L nares TID  Bacitracin ointment to B/L nares BID  Erythromycin for the duration of the packing

## 2017-10-16 NOTE — CONSULT NOTE ADULT - SUBJECTIVE AND OBJECTIVE BOX
[facility] Ophthalmology Consult Note    HPI:  89F w/ dementia, on ASA p/w fall and multiple traumatic injuries. Pt states she was walking around in [facility] to the food area and tripped on the carpet. She hit the right side of her head and torso when she fell. Reportedly this fall was witnessed by many people around her. Pt denies any loss of consciousness or any other subjective complaints during this event. She currently lives at [facility] assisted living. As per ER notes, daughter came to bedside at some point during ER visit. Pt is mildly disoriented. Denies changes in vision. Endorses some right periorbital pain. Denies diplopia.     PMH: None  Meds: None  POcHx (including surgeries/lasers/trauma):  None  Drops: None  FamHx: None  Social Hx: None  Allergies: NKDA    ROS:  General (neg), Vision (per HPI), Head and Neck (neg), Pulm (neg), CV (neg), GI (neg),  (neg), Musculoskeletal (neg), Skin/Integ (neg), Neuro (neg), Endocrine (neg), Heme (neg), All/Immuno (neg)    Mood and Affect Appropriate ( x ),  Oriented to Time, Place, and Person x 1 ( x )    Ophthalmology Exam    Visual acuity (sc): 20/50- OU (pt drowsy)  Pupils: PERRL OU, no APD  Ttono: 18, 16  Extraocular movements (EOMs): Full OU, no pain, no diplopia   Confrontational Visual Field (CVF):  unable due to mental status OU  Color Plates: unable due to mental status    Pen Light Exam (PLE)  External:  Right upper lid hematoma, sutured laceration on right forehead,   Lids/Lashes/Lacrimal Ducts: Upper lid hematoma OD, small ecchymosis on lower lid OU  Sclera/Conjunctiva:  subconj heme inferiorly OD, W+Q OS  Cornea: Cl OU, no epi defect  Anterior Chamber: D+F OU, no hyphema  Iris:  Flat OU, no iris peaking  Lens:  Cl OU    Fundus Exam: dilated with 1% tropicamide and 2.5% phenylephrine  Approval obtained from primary team for dilation  Patient aware that pupils can remained dilated for at least 4-6 hours  Exam performed with 20D lens    Vitreous: wnl OU, no VH  Disc, cup/disc: sharp and pink, 0.4 OU  Macula:  wnl OU  Vessels:  wnl OU  Periphery: wnl OU, no RD/tear, no VH    Diagnostic Testing:  CT head  FINDINGS:     There are new thin linear hyperdensities within the sulci of the right   parietotemporal region compatible with subarachnoid hemorrhage.     There is an acute minimally displaced fracture involving the superior   roof and medial wall of the right orbit. There is an associated hematoma   along the right superior rectus muscle. There is an air-fluid level in   the right maxillary sinus which is similar appearance to the prior exam.    There is no other evidence of intracranial hemorrhage.    There are scattered confluent white matter hypodensities compatible with   chronic microvascular-type change.    There is prominence of the ventricles compatible with chronic   involutional changes. There is bifrontal cerebral atrophy.     There is a nasal tube within the left nasal cavity.     There is bilateral sinus disease as described in detail on the recent   maxillofacial CT from October 15, 2017.    The mastoid air cells and middle ear cavities are grossly clear.    The right frontal and periorbital scalp soft tissue swelling is similar   appearance to the prior study.     There is no displaced calvarial fracture. [facility] Ophthalmology Consult Note    HPI:  89F w/ dementia, on ASA p/w fall and multiple traumatic injuries. Pt states she was walking around in [facility] to the food area and tripped on the carpet. She hit the right side of her head and torso when she fell. Reportedly this fall was witnessed by many people around her. Pt denies any loss of consciousness or any other subjective complaints during this event. She currently lives at [facility] assisted living. As per ER notes, daughter came to bedside at some point during ER visit. Pt is mildly disoriented. Denies changes in vision. Endorses some right periorbital pain. Denies diplopia.     PMH: as above  Meds: None  POcHx (including surgeries/lasers/trauma):  None  Drops: None  FamHx: None  Social Hx: None  Allergies: NKDA    ROS:  General (neg), Vision (per HPI), Head and Neck (neg), Pulm (neg), CV (neg), GI (neg),  (neg), Musculoskeletal (neg), Skin/Integ (neg), Neuro (neg), Endocrine (neg), Heme (neg), All/Immuno (neg)    Mood and Affect Appropriate ( x ),  Oriented to Time, Place, and Person x 1 ( x )    Ophthalmology Exam    Visual acuity (sc): 20/50- OU (pt drowsy)  Pupils: PERRL OU, no APD  Ttono: 18, 16  Extraocular movements (EOMs): Full OU, no pain, no diplopia   Confrontational Visual Field (CVF):  unable due to mental status OU  Color Plates: unable due to mental status    Pen Light Exam (PLE)  External:  Right upper lid hematoma, sutured laceration on right forehead,   Lids/Lashes/Lacrimal Ducts: Upper lid hematoma OD, small ecchymosis on lower lid OU  Sclera/Conjunctiva:  subconj heme inferiorly OD, W+Q OS  Cornea: Cl OU, no epi defect  Anterior Chamber: D+F OU, no hyphema  Iris:  Flat OU, no iris peaking  Lens:  Cl OU    no step offs OU  no V1 or V2 hypesthesia  no enophthalmos    Fundus Exam: dilated with 1% tropicamide and 2.5% phenylephrine  Approval obtained from primary team for dilation  Patient aware that pupils can remained dilated for at least 4-6 hours  Exam performed with 20D lens    Vitreous: wnl OU, no VH  Disc, cup/disc: sharp and pink, 0.4 OU  Macula:  wnl OU  Vessels:  wnl OU  Periphery: wnl OU, no RD/tear, no VH    Diagnostic Testing:  CT head  FINDINGS:     There are new thin linear hyperdensities within the sulci of the right   parietotemporal region compatible with subarachnoid hemorrhage.     There is an acute minimally displaced fracture involving the superior   roof and medial wall of the right orbit. There is an associated hematoma   along the right superior rectus muscle. There is an air-fluid level in   the right maxillary sinus which is similar appearance to the prior exam.    There is no other evidence of intracranial hemorrhage.    There are scattered confluent white matter hypodensities compatible with   chronic microvascular-type change.    There is prominence of the ventricles compatible with chronic   involutional changes. There is bifrontal cerebral atrophy.     There is a nasal tube within the left nasal cavity.     There is bilateral sinus disease as described in detail on the recent   maxillofacial CT from October 15, 2017.    The mastoid air cells and middle ear cavities are grossly clear.    The right frontal and periorbital scalp soft tissue swelling is similar   appearance to the prior study.     There is no displaced calvarial fracture.

## 2017-10-16 NOTE — H&P ADULT - PROBLEM SELECTOR PLAN 2
Pt without pain, with EOMI and preserved vision as pt able to describe room with only R eye and no blurry vision. However, R eyelid edema continued to worsen. Subconjunctival hemorrhage is 360 degrees.  -Consider opthalmology consult before discharge Pt without pain, with EOMI and preserved vision as pt able to describe room with only R eye and no blurry vision. However, R eyelid edema continued to worsen. Subconjunctival hemorrhage is almost degrees.  -Will consult opthalmology

## 2017-10-16 NOTE — H&P ADULT - NSHPSOCIALHISTORY_GEN_ALL_CORE
Denies significant smoking or ETOH history.   aged 50s, currently resides at [facility] long term care [facility]

## 2017-10-16 NOTE — PROGRESS NOTE ADULT - ASSESSMENT
89F s/p mechanical fall with trace right insular traumatic SAH. Syncopal episode overnight. Repeat CTH demonstrating thin tSAH.     -No acute neurosurgical intervention indicated at this time  -Pain control  -Repeat CT head at 9:00am 10/17/17  -If stable or minimally changed, may follow up as outpatient with Dr. Stone in 2 weeks

## 2017-10-16 NOTE — H&P ADULT - PROBLEM SELECTOR PLAN 5
Currently AAOx1 but appears to recall events of the day relatively well. Lives at assisted living facility.  -Pt states daughter will pick her up in AM  -PT consult

## 2017-10-16 NOTE — CONSULT NOTE ADULT - SUBJECTIVE AND OBJECTIVE BOX
CC: Epistaxis    HPI: 89F w/ dementia, on ASA p/w fall and multiple traumatic injuries. Pt states she was walking around in [facility] to the food area and tripped on the carpet. She hit the right side of her head and torso when she fell. Reportedly this fall was witnessed by many people around her. ENT was called to evaluate pt for left sided nose bleed. Pt states it started a few hours ago, she applied digital pressure with no success. Pt on ASA, but denies coumadin, plavix use, recent sinusitis, nasal trauma.    PAST MEDICAL & SURGICAL HISTORY:  No pertinent past medical history  No significant past surgical history    Allergies    penicillin G benzathine (Unknown)    Intolerances      MEDICATIONS  (STANDING):  azithromycin   Tablet 250 milliGRAM(s) Oral daily  BACItracin   Ointment 1 Application(s) Topical two times a day    MEDICATIONS  (PRN):  acetaminophen   Tablet. 650 milliGRAM(s) Oral every 6 hours PRN Moderate Pain (4 - 6)  oxyCODONE    IR 5 milliGRAM(s) Oral every 6 hours PRN Moderate Pain (4 - 6)  sodium chloride 0.65% Nasal 1 Spray(s) Both Nostrils three times a day PRN Nasal Congestion    Social History: No tobacco use    ROS: ENT, GI, , CV, Pulm, Neuro, Psych, MS, Heme, Endo, Constitutional; all negative except as noted in HPI    Vital Signs Last 24 Hrs  T(C): 36.6 (16 Oct 2017 00:11), Max: 36.7 (15 Oct 2017 17:04)  T(F): 97.9 (16 Oct 2017 00:11), Max: 98 (15 Oct 2017 17:04)  HR: 66 (16 Oct 2017 02:30) (66 - 80)  BP: 118/55 (16 Oct 2017 02:30) (86/65 - 140/69)  BP(mean): --  RR: 18 (16 Oct 2017 02:30) (16 - 20)  SpO2: 97% (16 Oct 2017 02:30) (97% - 100%)                          10.9   7.4   )-----------( 198      ( 16 Oct 2017 04:03 )             33.3    10-16    135  |  100  |  18  ----------------------------<  121<H>  3.7   |  22  |  0.57    Ca    8.5      16 Oct 2017 04:07    TPro  7.4  /  Alb  4.0  /  TBili  0.2  /  DBili  x   /  AST  26  /  ALT  14  /  AlkPhos  76  10-15   PT/INR - ( 15 Oct 2017 17:03 )   PT: 11.6 sec;   INR: 1.07 ratio         PTT - ( 15 Oct 2017 17:03 )  PTT:29.1 sec    PHYSICAL EXAM:  Gen: NAD, well-developed  Head: Normocephalic, Atraumatic  Face: no edema/erythema/fluctuance, parotid glands soft without mass  Eyes: PERRL, EOMI, no scleral injection  Nose: Actively bleeding from left nare, large blood clot removed, gelfoam packing used to control bleeding  Mouth: Mucosa moist, tongue/uvula midline, oropharynx clear  Neck: Flat, supple, no lymphadenopathy, trachea midline, no masses  Resp: no use of accessory muscles, no stridor  CV: no peripheral edema/cyanosis

## 2017-10-16 NOTE — CONSULT NOTE ADULT - ATTENDING COMMENTS
89yF s/p witnessed mechanical fall w/ traumatic SAH  given extreme of age and poor baseline functional status would consider goals of care discussion
I have interviewed and examined the patient and reviewed the residents note including the history, exam, assessment, and plan.  I agree with the residents assessment and plan.    A/P: 89 F with multiple traumatic injuries s/p witnessed mechanical fall. CT significant for orbital roof and medial wall fracture OD with hematoma of superior rectus muscle. EOMs intact. IOP wnl. Globes intact. VA equal in both eyes. Subconjunctival hemorrhage.   - abx as per primary  - avoid nose blowing  - ice packs q1-2 hr OD while awake for the first 48 hours  - facial plastics consult  - appreciate NSGY recs  - artificial tears OU    Follow-Up:  Patient should follow up his/her ophthalmologist or in the [facility] within 1 week of discharge, sooner if symptoms worsen or change.    Pattie Combs MD

## 2017-10-16 NOTE — CONSULT NOTE ADULT - SUBJECTIVE AND OBJECTIVE BOX
SUBJECTIVE:    CC: Gris Dixon is a 89 y.o. woman who had a ground-level fall on 10/15, resulting in right superior and medial orbital wall fractures    HPI: On 10/15, the patient was walking in the dining room of her assisted living facility, when she tripped and fell, hitting the right side of her face, chest, and shoulder. The fall was witnessed, but LOC is unknown; patient denied in the ED. Initial CT scans did not show any facial fractures, but did show a questionable right frontal area of hypertensivty, prompting rescan today. The scans from 10/16 showed orbital fractures.     The patient denies any headache, visual changes, double vision, or blurred vision.     Takes ASA 81 mg at home.     OBJECTIVE:     PMH:  - Dementia    PSH:  - None    Allergies:   - penicillin G benzathine (Unknown)    MEDICATIONS  (STANDING):  - azithromycin   Tablet 250 milliGRAM(s) Oral daily  - BACItracin   Ointment 1 Application(s) Topical two times a day    MEDICATIONS  (PRN):  - acetaminophen   Tablet. 650 milliGRAM(s) Oral every 6 hours PRN Moderate Pain (4 - 6)  - oxyCODONE    IR 5 milliGRAM(s) Oral every 6 hours PRN Moderate Pain (4 - 6)  - sodium chloride 0.65% Nasal 1 Spray(s) Both Nostrils three times a day PRN Nasal Congestion    ICU Vital Signs Last 24 Hrs  - T(C): 36.8 (16 Oct 2017 13:41), Max: 36.8 (16 Oct 2017 13:41)  - HR: 85 (16 Oct 2017 13:41) (66 - 85)  - BP: 106/50 (16 Oct 2017 13:41) (86/65 - 140/69)  - RR: 19 (16 Oct 2017 13:41) (16 - 20)  - SpO2: 99% (16 Oct 2017 13:41) (86% - 100%)    I&O's Detail    15 Oct 2017 07:01  -  16 Oct 2017 07:00  --------------------------------------------------------  IN:    Oral Fluid: 240 mL    Sodium Chloride 0.9% IV Bolus: 250 mL  Total IN: 490 mL    OUT:    Estimated Blood Loss: 300 mL    Voided: 400 mL  Total OUT: 700 mL    Total NET: -210 mL    Physical Exam:  - Constitutional: alert, interactive, tracks and participates in exam   - HEENT: R periorbital swelling and ecchymosis, R sclera hemorrhagic, no proptosis, b/l PERRLA, b/l EOMI, visual acuity equal (easily reads 20/40 on Snellen chart), b/l V1/2/3 intact (equal sensation, flex masseter), equal eyebrow raise, equal smile, minimal tenderness    Labs:                        10.9   7.4   )-----------( 198      ( 16 Oct 2017 04:03 )             33.3     10-16    135  |  100  |  18  ----------------------------<  121<H>  3.7   |  22  |  0.57    Ca    8.5      16 Oct 2017 04:07    TPro  7.4  /  Alb  4.0  /  TBili  0.2  /  DBili  x   /  AST  26  /  ALT  14  /  AlkPhos  76  10-15    PT/INR - ( 15 Oct 2017 17:03 )   PT: 11.6 sec;   INR: 1.07 ratio      PTT - ( 15 Oct 2017 17:03 )  PTT:29.1 sec    Rads:   CT Head (10/16): There is an acute minimally displaced fracture involving the superior roof and medial wall of the right orbit. There is an associated hematoma along the right superior rectus muscle. Right parietotemporal region subarachnoid hemorrhage.    CT Head (10/15, 6271): Ovoid hyperdense region in the right insular cistern and not conspicuous on the current study and likely artifact on prior exam. Otherwise, no acute intracranial finding.    CT Head (10/15, 0492): Mild right frontal scalp swelling. Tiny subarachnoid hemorrhage versus small aneurysm right insular cistern. No acute territorial infarct or calvarial fracture. Chronic additional findings as above.    CT Maxillofacial (10/15): Right preseptal periorbital soft tissue swelling. Presumed chronic maxillary sinusitis with near complete opacification, inspissated material and prominent vascular grooves in the lateral walls. No definite acute facial fracture.    CT C-spine (10/15): No evidence of acute fracture or traumatic malalignment. There is mild vertebral body height loss at C3 on C4 likely degenerative. Multilevel spondylosis. If clinical suspicion persists MRI can be obtained if no contra indications. SUBJECTIVE:    CC: Gris Dixon is a 89 y.o. woman who had a ground-level fall on 10/15, resulting in right superior and medial orbital wall fractures    HPI: On 10/15, the patient was walking in the dining room of her assisted living facility, when she tripped and fell, hitting the right side of her face, chest, and shoulder. The fall was witnessed, but LOC is unknown; patient denied in the ED. Initial CT scan report did not note any facial fractures, but patient did have a questionable right frontal area of hypertensivty, prompting rescan today. Report from scans obtained on 10/16 indicate fractures as listed above.    The patient denies any headache, visual changes, double vision, or blurred vision. She denies eye pain.     Takes ASA 81 mg at home.     OBJECTIVE:     PMH:  - Dementia    PSH:  - None    Allergies:   - penicillin G benzathine (Unknown)    MEDICATIONS  (STANDING):  - azithromycin   Tablet 250 milliGRAM(s) Oral daily  - BACItracin   Ointment 1 Application(s) Topical two times a day    MEDICATIONS  (PRN):  - acetaminophen   Tablet. 650 milliGRAM(s) Oral every 6 hours PRN Moderate Pain (4 - 6)  - oxyCODONE    IR 5 milliGRAM(s) Oral every 6 hours PRN Moderate Pain (4 - 6)  - sodium chloride 0.65% Nasal 1 Spray(s) Both Nostrils three times a day PRN Nasal Congestion    ICU Vital Signs Last 24 Hrs  - T(C): 36.8 (16 Oct 2017 13:41), Max: 36.8 (16 Oct 2017 13:41)  - HR: 85 (16 Oct 2017 13:41) (66 - 85)  - BP: 106/50 (16 Oct 2017 13:41) (86/65 - 140/69)  - RR: 19 (16 Oct 2017 13:41) (16 - 20)  - SpO2: 99% (16 Oct 2017 13:41) (86% - 100%)    I&O's Detail    15 Oct 2017 07:01  -  16 Oct 2017 07:00  --------------------------------------------------------  IN:    Oral Fluid: 240 mL    Sodium Chloride 0.9% IV Bolus: 250 mL  Total IN: 490 mL    OUT:    Estimated Blood Loss: 300 mL    Voided: 400 mL  Total OUT: 700 mL    Total NET: -210 mL    Physical Exam:  - Constitutional: alert, interactive, answers appropriately.  - HEENT: R periorbital swelling and ecchymosis, R scleral hematoma, no proptosis, b/l PERRLA, b/l EOMI, visual acuity equal (easily reads 20/40 on Snellen chart), b/l V1/2/3 intact (equal sensation, flex masseter), equal eyebrow raise, equal smile, minimal tenderness. Premorbid occlusion intact. No trismus. No intraoral lacerations or obviously loose dentition.    Labs:                        10.9   7.4   )-----------( 198      ( 16 Oct 2017 04:03 )             33.3     10-16    135  |  100  |  18  ----------------------------<  121<H>  3.7   |  22  |  0.57    Ca    8.5      16 Oct 2017 04:07    TPro  7.4  /  Alb  4.0  /  TBili  0.2  /  DBili  x   /  AST  26  /  ALT  14  /  AlkPhos  76  10-15    PT/INR - ( 15 Oct 2017 17:03 )   PT: 11.6 sec;   INR: 1.07 ratio      PTT - ( 15 Oct 2017 17:03 )  PTT:29.1 sec    Rads:   CT Head (10/16): There is an acute minimally displaced fracture involving the superior roof and medial wall of the right orbit. There is an associated hematoma along the right superior rectus muscle. Right parietotemporal region subarachnoid hemorrhage.    CT Head (10/15, 1987): Ovoid hyperdense region in the right insular cistern and not conspicuous on the current study and likely artifact on prior exam. Otherwise, no acute intracranial finding.    CT Head (10/15, 4942): Mild right frontal scalp swelling. Tiny subarachnoid hemorrhage versus small aneurysm right insular cistern. No acute territorial infarct or calvarial fracture. Chronic additional findings as above.    CT Maxillofacial (10/15): Right preseptal periorbital soft tissue swelling. Presumed chronic maxillary sinusitis with near complete opacification, inspissated material and prominent vascular grooves in the lateral walls. No definite acute facial fracture.    CT C-spine (10/15): No evidence of acute fracture or traumatic malalignment. There is mild vertebral body height loss at C3 on C4 likely degenerative. Multilevel spondylosis. If clinical suspicion persists MRI can be obtained if no contra indications.    Per own read - mildly displaced right medial orbital wall fracture and minimally displaced frontal bone fracture extending to superior orbit are present on CT scans on 10/15 AND 10/16. No significant retro-bulbar collections or hematoma in the extra ocular muscles are discernable.

## 2017-10-16 NOTE — PROGRESS NOTE ADULT - SUBJECTIVE AND OBJECTIVE BOX
Visit Summary: Patient seen and evaluated at bedside.    Overnight Events: syncopal episode    Exam:  T(C): 36.6 (10-16-17 @ 00:11), Max: 36.7 (10-15-17 @ 17:04)  HR: 66 (10-16-17 @ 02:30) (66 - 80)  BP: 118/55 (10-16-17 @ 02:30) (86/65 - 140/69)  RR: 18 (10-16-17 @ 02:30) (16 - 20)  SpO2: 97% (10-16-17 @ 02:30) (97% - 100%)  Wt(kg): --  CN: PERRL, EOMI, V1-3 intact, no facial droop appreciated, palate elevation symmetric, tongue midline, shoulder shrug 5/5    Motor: 5/5 throughout, no drift    Sensation: intact to light touch    Reflexes: No clonus or babinski    Gait: not assessed                           10.9   7.4   )-----------( 198      ( 16 Oct 2017 04:03 )             33.3     10-16    135  |  100  |  18  ----------------------------<  121<H>  3.7   |  22  |  0.57    Ca    8.5      16 Oct 2017 04:07    TPro  7.4  /  Alb  4.0  /  TBili  0.2  /  DBili  x   /  AST  26  /  ALT  14  /  AlkPhos  76  10-15  PT/INR - ( 15 Oct 2017 17:03 )   PT: 11.6 sec;   INR: 1.07 ratio         PTT - ( 15 Oct 2017 17:03 )  PTT:29.1 sec

## 2017-10-16 NOTE — H&P ADULT - HISTORY OF PRESENT ILLNESS
89F w/ dementia, on ASA p/w fall and multiple traumatic injuries. Pt states she was walking around in [facility] to the food area and tripped on the carpet. She hit the right side of her head and torso when she fell. Reportedly this fall was witnessed by many people around her. Pt denies any loss of consciousness or any other subjective complaints during this event. She currently lives at [facility]. As per ER notes, daughter came to bedside at some point during ER visit. Pt states her daughter plans to take her home in the morning. Pt denies chest pain, SOB, palpitations. Pain in R arm is currently limited.     In ER: Given Tylenol 1gm, Tdap, Afrin

## 2017-10-16 NOTE — H&P ADULT - EYES COMMENTS
300 degree subconjunctival hemorrhage in R eye, with preserved vision on exam. Unable to open R eye without assistance

## 2017-10-16 NOTE — CHART NOTE - NSCHARTNOTEFT_GEN_A_CORE
89yo F with dementia (baseline AAOx1) on ASA, presented from [facility] last night s/p witnessed mechanical fall. Pt found to have tiny subarachnoid hemorrhage   versus small aneurysm right insular cistern on initial CTH, repeat CTH showed ovoid hyperdense region in the right insular cistern read to be likely artifact. Pt also found to have closed R humeral fracture and R subconjunctival hemorrhage with eyelid ecchymosis.     RRT was called for "seizure-like movements." Per RN, pt was sitting on toilet this AM urinating when she "slumped back and had generalized shaking." RN states it lasted for about 10 secs, pt woke up stating, "I can't" and then had another episode of shaking that lasted for another 10 secs. RN denies any eye rolling back, bowel incontinence. No known hx of seizures at this time.     Upon arrival, pt responsive to voice, said "I can't I can't." VS wnl, PERRL, able to follow simple commands, able to move all 4 extremities. Pt later opened her eyes to command. EKG no acute abnormalities. AM labs showing Hb 10.9 from baseline around 12-13, but no electrolyte abnormalities.     Possibly seizure vs syncopal episodes (vasovagal). Given that generalized shaking movements lasted for 10 secs without post-ictal state, unclear if really seizures, but given possible SAH, cannot rule it out. May also have been syncopal episodes (vasovagal while pt urinating) but unclear about generalized shaking movements.     Plan:    - f/u repeat CT head  - f/u Neurosx recs  - will transfer to telemetry bed for further ?syncope vs. seizure w/u  - seizure precautions for now  - Would need to contact family to see if pt had hx of seizures in the past  - Discussed with hospitalist Dr. Blackwood  - Discussed with CAROL Mcginnis

## 2017-10-16 NOTE — H&P ADULT - NEGATIVE OPHTHALMOLOGIC SYMPTOMS
no blurred vision R/no discharge R/no pain L/no pain R/no discharge L/no blurred vision L/no photophobia

## 2017-10-17 LAB
ANION GAP SERPL CALC-SCNC: 12 MMOL/L — SIGNIFICANT CHANGE UP (ref 5–17)
APPEARANCE UR: CLEAR — SIGNIFICANT CHANGE UP
BILIRUB UR-MCNC: NEGATIVE — SIGNIFICANT CHANGE UP
BLD GP AB SCN SERPL QL: NEGATIVE — SIGNIFICANT CHANGE UP
BUN SERPL-MCNC: 16 MG/DL — SIGNIFICANT CHANGE UP (ref 7–23)
CALCIUM SERPL-MCNC: 8.5 MG/DL — SIGNIFICANT CHANGE UP (ref 8.4–10.5)
CHLORIDE SERPL-SCNC: 101 MMOL/L — SIGNIFICANT CHANGE UP (ref 96–108)
CO2 SERPL-SCNC: 23 MMOL/L — SIGNIFICANT CHANGE UP (ref 22–31)
COLOR SPEC: YELLOW — SIGNIFICANT CHANGE UP
CREAT SERPL-MCNC: 0.63 MG/DL — SIGNIFICANT CHANGE UP (ref 0.5–1.3)
DIFF PNL FLD: NEGATIVE — SIGNIFICANT CHANGE UP
GLUCOSE SERPL-MCNC: 114 MG/DL — HIGH (ref 70–99)
GLUCOSE UR QL: NEGATIVE — SIGNIFICANT CHANGE UP
HCT VFR BLD CALC: 26.3 % — LOW (ref 34.5–45)
HCT VFR BLD CALC: 27.3 % — LOW (ref 34.5–45)
HGB BLD-MCNC: 8.6 G/DL — LOW (ref 11.5–15.5)
HGB BLD-MCNC: 9.8 G/DL — LOW (ref 11.5–15.5)
KETONES UR-MCNC: NEGATIVE — SIGNIFICANT CHANGE UP
LEUKOCYTE ESTERASE UR-ACNC: NEGATIVE — SIGNIFICANT CHANGE UP
MAGNESIUM SERPL-MCNC: 2 MG/DL — SIGNIFICANT CHANGE UP (ref 1.6–2.6)
MCHC RBC-ENTMCNC: 32.3 PG — SIGNIFICANT CHANGE UP (ref 27–34)
MCHC RBC-ENTMCNC: 32.7 GM/DL — SIGNIFICANT CHANGE UP (ref 32–36)
MCHC RBC-ENTMCNC: 35.8 GM/DL — SIGNIFICANT CHANGE UP (ref 32–36)
MCHC RBC-ENTMCNC: 36.8 PG — HIGH (ref 27–34)
MCV RBC AUTO: 103 FL — HIGH (ref 80–100)
MCV RBC AUTO: 98.9 FL — SIGNIFICANT CHANGE UP (ref 80–100)
NITRITE UR-MCNC: NEGATIVE — SIGNIFICANT CHANGE UP
PH UR: 6.5 — SIGNIFICANT CHANGE UP (ref 5–8)
PHOSPHATE SERPL-MCNC: 2.1 MG/DL — LOW (ref 2.5–4.5)
PLATELET # BLD AUTO: 163 K/UL — SIGNIFICANT CHANGE UP (ref 150–400)
PLATELET # BLD AUTO: 173 K/UL — SIGNIFICANT CHANGE UP (ref 150–400)
POTASSIUM SERPL-MCNC: 3.8 MMOL/L — SIGNIFICANT CHANGE UP (ref 3.5–5.3)
POTASSIUM SERPL-SCNC: 3.8 MMOL/L — SIGNIFICANT CHANGE UP (ref 3.5–5.3)
PROT UR-MCNC: SIGNIFICANT CHANGE UP
RBC # BLD: 2.66 M/UL — LOW (ref 3.8–5.2)
RBC # BLD: 2.66 M/UL — LOW (ref 3.8–5.2)
RBC # FLD: 12.1 % — SIGNIFICANT CHANGE UP (ref 10.3–14.5)
RBC # FLD: 13.6 % — SIGNIFICANT CHANGE UP (ref 10.3–14.5)
RBC CASTS # UR COMP ASSIST: SIGNIFICANT CHANGE UP /HPF (ref 0–2)
RH IG SCN BLD-IMP: POSITIVE — SIGNIFICANT CHANGE UP
SODIUM SERPL-SCNC: 136 MMOL/L — SIGNIFICANT CHANGE UP (ref 135–145)
SP GR SPEC: 1.02 — SIGNIFICANT CHANGE UP (ref 1.01–1.02)
UROBILINOGEN FLD QL: NEGATIVE — SIGNIFICANT CHANGE UP
WBC # BLD: 6.4 K/UL — SIGNIFICANT CHANGE UP (ref 3.8–10.5)
WBC # BLD: 7.02 K/UL — SIGNIFICANT CHANGE UP (ref 3.8–10.5)
WBC # FLD AUTO: 6.4 K/UL — SIGNIFICANT CHANGE UP (ref 3.8–10.5)
WBC # FLD AUTO: 7.02 K/UL — SIGNIFICANT CHANGE UP (ref 3.8–10.5)
WBC UR QL: SIGNIFICANT CHANGE UP /HPF (ref 0–5)

## 2017-10-17 PROCEDURE — 70450 CT HEAD/BRAIN W/O DYE: CPT | Mod: 26

## 2017-10-17 PROCEDURE — 99233 SBSQ HOSP IP/OBS HIGH 50: CPT

## 2017-10-17 RX ORDER — SODIUM CHLORIDE 9 MG/ML
1000 INJECTION, SOLUTION INTRAVENOUS
Qty: 0 | Refills: 0 | Status: DISCONTINUED | OUTPATIENT
Start: 2017-10-17 | End: 2017-10-17

## 2017-10-17 RX ORDER — SODIUM CHLORIDE 9 MG/ML
500 INJECTION, SOLUTION INTRAVENOUS
Qty: 0 | Refills: 0 | Status: DISCONTINUED | OUTPATIENT
Start: 2017-10-17 | End: 2017-10-18

## 2017-10-17 RX ORDER — POTASSIUM PHOSPHATE, MONOBASIC POTASSIUM PHOSPHATE, DIBASIC 236; 224 MG/ML; MG/ML
15 INJECTION, SOLUTION INTRAVENOUS ONCE
Qty: 0 | Refills: 0 | Status: COMPLETED | OUTPATIENT
Start: 2017-10-17 | End: 2017-10-17

## 2017-10-17 RX ADMIN — SODIUM CHLORIDE 50 MILLILITER(S): 9 INJECTION, SOLUTION INTRAVENOUS at 17:41

## 2017-10-17 RX ADMIN — Medication 1 APPLICATION(S): at 22:58

## 2017-10-17 RX ADMIN — Medication 650 MILLIGRAM(S): at 23:45

## 2017-10-17 RX ADMIN — AZITHROMYCIN 250 MILLIGRAM(S): 500 TABLET, FILM COATED ORAL at 12:27

## 2017-10-17 RX ADMIN — Medication 1 DROP(S): at 13:32

## 2017-10-17 RX ADMIN — Medication 1 APPLICATION(S): at 17:41

## 2017-10-17 RX ADMIN — Medication 1 DROP(S): at 22:59

## 2017-10-17 RX ADMIN — SODIUM CHLORIDE 50 MILLILITER(S): 9 INJECTION, SOLUTION INTRAVENOUS at 09:29

## 2017-10-17 RX ADMIN — Medication 1 DROP(S): at 05:59

## 2017-10-17 RX ADMIN — Medication 1 APPLICATION(S): at 05:58

## 2017-10-17 RX ADMIN — Medication 1 APPLICATION(S): at 09:38

## 2017-10-17 RX ADMIN — POTASSIUM PHOSPHATE, MONOBASIC POTASSIUM PHOSPHATE, DIBASIC 62.5 MILLIMOLE(S): 236; 224 INJECTION, SOLUTION INTRAVENOUS at 13:32

## 2017-10-17 RX ADMIN — Medication 650 MILLIGRAM(S): at 22:59

## 2017-10-17 NOTE — PROGRESS NOTE ADULT - SUBJECTIVE AND OBJECTIVE BOX
POD/STATUS POST/ENT ISSUE: s/p 7.5cm rapid rhino placement in left nare on Monday 10/16 for epistaxis.     INTERVAL HPI: Patient examined at bedside, sitting up in chair, no further bleeding from left nare, RR packing in place, patient tolerating PO, no episodes of bleeding by mouth. Denies SOB/CP/N/V/D.     Vital Signs Last 24 Hrs  T(C): 37.2 (17 Oct 2017 13:48), Max: 37.2 (17 Oct 2017 13:48)  T(F): 99 (17 Oct 2017 13:48), Max: 99 (17 Oct 2017 13:48)  HR: 93 (17 Oct 2017 13:48) (83 - 93)  BP: 111/67 (17 Oct 2017 13:48) (96/58 - 114/70)  BP(mean): --  RR: 19 (17 Oct 2017 13:48) (18 - 19)  SpO2: 96% (17 Oct 2017 13:48) (95% - 96%)    PHYSICAL EXAM:  Gen: NAD, well-developed alert to baseline  Head: Normocephalic, Atraumatic  Eyes:  Right Eye-  Periorbital edema and ecchymosis, decreased from previous exam, unable to open eye fully.   Left Eye, EOMI, no scleral injection  Nose:  Left nare with Rapid Rhino (4.5) placed inflated with Normal Saline, surgicel anterior, right nare with +dried blood  Mouth: Mucosa moist, tongue/uvula midline, oropharynx with residual blood in Posterior OP  Neck: Flat, supple, no lymphadenopathy, trachea midline, no masses  Resp: No stridor, on room air, no use of accessory muscles.     LABS:                        9.8    6.4   )-----------( 163      ( 17 Oct 2017 12:43 )             27.3

## 2017-10-17 NOTE — PHYSICAL THERAPY INITIAL EVALUATION ADULT - TRANSFER SAFETY CONCERNS NOTED: SIT/STAND, REHAB EVAL
decreased step length/decreased safety awareness/losing balance/stepping too close to front of assistive device/decreased weight-shifting ability

## 2017-10-17 NOTE — PHYSICAL THERAPY INITIAL EVALUATION ADULT - ADDITIONAL COMMENTS
CT HEAD: Mild right frontal scalp swelling. Tiny subarachnoid hemorrhage versus small aneurysm right insular cistern. No acute territorial infarct or calvarial fracture. Chronic additional findings as above. CT MAXILLOFACIAL: Right preseptal periorbital soft tissue swelling. Presumed chronic maxillary sinusitis with near complete opacification, inspissated material and prominent vascular grooves in the lateral walls. No definite acute facial fracture. CT CERVICAL SPINE: No evidence of acute fracture or traumatic malalignment. There is mild vertebral body height loss at C3 on C4 likely degenerative. Multilevel spondylosis. If clinical suspicion persists MRI can be obtained if no contra indications. CT HEAD: Mild right frontal scalp swelling. Tiny subarachnoid hemorrhage versus small aneurysm right insular cistern. No acute territorial infarct or calvarial fracture. Chronic additional findings as above. CT MAXILLOFACIAL: Right preseptal periorbital soft tissue swelling. Presumed chronic maxillary sinusitis with near complete opacification, inspissated material and prominent vascular grooves in the lateral walls. No definite acute facial fracture. CT CERVICAL SPINE: No evidence of acute fracture or traumatic malalignment. There is mild vertebral body height loss at C3 on C4 likely degenerative. Multilevel spondylosis. If clinical suspicion persists MRI can be obtained if no contra indications. XR chest: Patchy opacities in the RLL and RUL which may represent atelectasis versus pneumonia in the appropriate clinical setting. XR pelvis: (-), XR shoulder: (-)

## 2017-10-17 NOTE — PROGRESS NOTE ADULT - ASSESSMENT
89 y/o F with Left Anterior Epistaxis controlled with Nasal Packing, on azithromycin for TSS ppx, RR deflated at bedside today, no further bleed through packing during admission.

## 2017-10-17 NOTE — PHYSICAL THERAPY INITIAL EVALUATION ADULT - LIVES WITH, PROFILE
alone/pt lives alone in assisted living facility, PTA was independent with all functional mobility, & ambulating without AD.

## 2017-10-17 NOTE — PROGRESS NOTE ADULT - PROBLEM SELECTOR PLAN 1
Continue ABX for packing prophylaxis  Nasal Saline b/l 3x/day  Bacitracin Ointment B/L 2x/day  Avoid nasal trauma  Monitor H/H transfuse prn  Care per Med Team.

## 2017-10-17 NOTE — PROVIDER CONTACT NOTE (OTHER) - RECOMMENDATIONS
will continue to encourage void with bedpan and monitor.  pt combative and aggitated, no straight Cath at this time.

## 2017-10-17 NOTE — PROGRESS NOTE ADULT - SUBJECTIVE AND OBJECTIVE BOX
Authored by Endy Lugo, DO: Beeper#[phone]    REYNA CLAUDIO  89y  Female      Patient is a 89y old  Female who presents with a chief complaint of Fall and multiple injuries (16 Oct 2017 00:18)      INTERVAL HPI/OVERNIGHT EVENTS:  No acute evnets overnight.  Patient much more conversant today. AAOx2.  States slight body discomfort, no overt pain.  no other complaints. No fever, chills, chset pain, SOB, abdominal pain, n/v/d/c,dysuria.    Vital Signs Last 24 Hrs  T(C): 37.2 (17 Oct 2017 13:48), Max: 37.2 (17 Oct 2017 13:48)  T(F): 99 (17 Oct 2017 13:48), Max: 99 (17 Oct 2017 13:48)  HR: 93 (17 Oct 2017 13:48) (83 - 93)  BP: 111/67 (17 Oct 2017 13:48) (96/58 - 114/70)  BP(mean): --  RR: 19 (17 Oct 2017 13:48) (18 - 19)  SpO2: 96% (17 Oct 2017 13:48) (95% - 96%)    PHYSICAL EXAM:  GENERAL: NAD  HEENT: right eye soft tissue swelled shut with right sided facial swelling and ecchymosis.  EOMI   CARDS: +S1S2. rrr.  No m/r/g  RESP: CTA b/l.  No r/r/w  ABD: Soft.  NT/ND.  +BS  EXT: No c/c/e.  movign b/l upper extemiteis spontaneously.   SKIN : no flank or thigh ecchymosis    Consultant(s) Notes Reviewed:  [x ] YES  [ ] NO  Care Discussed with Consultants/Other Providers [ x] YES  [ ] NO:   LABS:                                   9.8    6.4   )-----------( 163      ( 17 Oct 2017 12:43 )             27.3   10-17    136  |  101  |  16  ----------------------------<  114<H>  3.8   |  23  |  0.63    Ca    8.5      17 Oct 2017 08:15  Phos  2.1     10-17  Mg     2.0     10-17           PTT - ( 15 Oct 2017 17:03 )  PTT:29.1 sec    CAPILLARY BLOOD GLUCOSE      POCT Blood Glucose.: 144 mg/dL (16 Oct 2017 06:55)            RADIOLOGY & ADDITIONAL TESTS:    Imaging Personally Reviewed:  [x ] YES  [ ] NO  < from: CT Head No Cont (10.17.17 @ 08:53) >  IMPRESSION: Stable right parietal subarachnoid hemorrhage. 7 x 6 mm right   anteromedial frontal parenchymal contusion is similar. Redemonstration of   a minimally depressed fracture of the medial right orbital roof.    < end of copied text >

## 2017-10-17 NOTE — PHYSICAL THERAPY INITIAL EVALUATION ADULT - CRITERIA FOR SKILLED THERAPEUTIC INTERVENTIONS
predicted duration of therapy intervention/therapy frequency/anticipated discharge recommendation/impairments found/rehab potential/functional limitations in following categories

## 2017-10-17 NOTE — PROGRESS NOTE ADULT - ASSESSMENT
89 yo female PMHx Dementia (per dajennye ris not on ASA) presents s/p mechanical fall c/b orbital and maxillary fractures as well as SAH.

## 2017-10-17 NOTE — PHYSICAL THERAPY INITIAL EVALUATION ADULT - PERTINENT HX OF CURRENT PROBLEM, REHAB EVAL
89F with history of dementia s/p mechanical fall today. Patient does not recall details of the events but no LOC. She was brought to hospital where CT head revealed possible right insular traumatic SAH. She denies headache, but states that her head feels uncomfortable. She has head wrap on for scalp laceration, orbital and maxillary fractures. No nausea/vomiting, weakness, numbness, tingling.  Aslo with

## 2017-10-18 DIAGNOSIS — G93.40 ENCEPHALOPATHY, UNSPECIFIED: ICD-10-CM

## 2017-10-18 LAB
ANION GAP SERPL CALC-SCNC: 12 MMOL/L — SIGNIFICANT CHANGE UP (ref 5–17)
BUN SERPL-MCNC: 9 MG/DL — SIGNIFICANT CHANGE UP (ref 7–23)
CALCIUM SERPL-MCNC: 8.1 MG/DL — LOW (ref 8.4–10.5)
CHLORIDE SERPL-SCNC: 99 MMOL/L — SIGNIFICANT CHANGE UP (ref 96–108)
CO2 SERPL-SCNC: 23 MMOL/L — SIGNIFICANT CHANGE UP (ref 22–31)
CREAT SERPL-MCNC: 0.46 MG/DL — LOW (ref 0.5–1.3)
GLUCOSE SERPL-MCNC: 123 MG/DL — HIGH (ref 70–99)
HCT VFR BLD CALC: 24.4 % — LOW (ref 34.5–45)
HCT VFR BLD CALC: 24.9 % — LOW (ref 34.5–45)
HGB BLD-MCNC: 8.5 G/DL — LOW (ref 11.5–15.5)
HGB BLD-MCNC: 8.7 G/DL — LOW (ref 11.5–15.5)
MCHC RBC-ENTMCNC: 33.5 PG — SIGNIFICANT CHANGE UP (ref 27–34)
MCHC RBC-ENTMCNC: 34.1 GM/DL — SIGNIFICANT CHANGE UP (ref 32–36)
MCHC RBC-ENTMCNC: 35.8 GM/DL — SIGNIFICANT CHANGE UP (ref 32–36)
MCHC RBC-ENTMCNC: 36.7 PG — HIGH (ref 27–34)
MCV RBC AUTO: 102 FL — HIGH (ref 80–100)
MCV RBC AUTO: 98 FL — SIGNIFICANT CHANGE UP (ref 80–100)
PHOSPHATE SERPL-MCNC: 2.3 MG/DL — LOW (ref 2.5–4.5)
PLATELET # BLD AUTO: 152 K/UL — SIGNIFICANT CHANGE UP (ref 150–400)
PLATELET # BLD AUTO: 154 K/UL — SIGNIFICANT CHANGE UP (ref 150–400)
POTASSIUM SERPL-MCNC: 3.7 MMOL/L — SIGNIFICANT CHANGE UP (ref 3.5–5.3)
POTASSIUM SERPL-SCNC: 3.7 MMOL/L — SIGNIFICANT CHANGE UP (ref 3.5–5.3)
RBC # BLD: 2.38 M/UL — LOW (ref 3.8–5.2)
RBC # BLD: 2.54 M/UL — LOW (ref 3.8–5.2)
RBC # FLD: 11.9 % — SIGNIFICANT CHANGE UP (ref 10.3–14.5)
RBC # FLD: 13.4 % — SIGNIFICANT CHANGE UP (ref 10.3–14.5)
SODIUM SERPL-SCNC: 134 MMOL/L — LOW (ref 135–145)
WBC # BLD: 5.64 K/UL — SIGNIFICANT CHANGE UP (ref 3.8–10.5)
WBC # BLD: 6.3 K/UL — SIGNIFICANT CHANGE UP (ref 3.8–10.5)
WBC # FLD AUTO: 5.64 K/UL — SIGNIFICANT CHANGE UP (ref 3.8–10.5)
WBC # FLD AUTO: 6.3 K/UL — SIGNIFICANT CHANGE UP (ref 3.8–10.5)

## 2017-10-18 PROCEDURE — 99233 SBSQ HOSP IP/OBS HIGH 50: CPT

## 2017-10-18 PROCEDURE — 71010: CPT | Mod: 26

## 2017-10-18 RX ORDER — POTASSIUM PHOSPHATE, MONOBASIC POTASSIUM PHOSPHATE, DIBASIC 236; 224 MG/ML; MG/ML
15 INJECTION, SOLUTION INTRAVENOUS ONCE
Qty: 0 | Refills: 0 | Status: COMPLETED | OUTPATIENT
Start: 2017-10-18 | End: 2017-10-18

## 2017-10-18 RX ORDER — QUETIAPINE FUMARATE 200 MG/1
12.5 TABLET, FILM COATED ORAL
Qty: 0 | Refills: 0 | Status: DISCONTINUED | OUTPATIENT
Start: 2017-10-18 | End: 2017-10-24

## 2017-10-18 RX ADMIN — Medication 1 DROP(S): at 21:53

## 2017-10-18 RX ADMIN — AZITHROMYCIN 250 MILLIGRAM(S): 500 TABLET, FILM COATED ORAL at 11:11

## 2017-10-18 RX ADMIN — POTASSIUM PHOSPHATE, MONOBASIC POTASSIUM PHOSPHATE, DIBASIC 62.5 MILLIMOLE(S): 236; 224 INJECTION, SOLUTION INTRAVENOUS at 14:00

## 2017-10-18 RX ADMIN — Medication 1 APPLICATION(S): at 18:27

## 2017-10-18 RX ADMIN — QUETIAPINE FUMARATE 12.5 MILLIGRAM(S): 200 TABLET, FILM COATED ORAL at 19:00

## 2017-10-18 RX ADMIN — Medication 1 DROP(S): at 14:01

## 2017-10-18 RX ADMIN — Medication 1 APPLICATION(S): at 21:52

## 2017-10-18 RX ADMIN — Medication 1 APPLICATION(S): at 10:04

## 2017-10-18 RX ADMIN — Medication 1 APPLICATION(S): at 10:05

## 2017-10-18 NOTE — PROGRESS NOTE ADULT - SUBJECTIVE AND OBJECTIVE BOX
ENT ISSUE: s/p left anterior epistaxis    HPI: 89yo female s/p left anterior epistaxis controlled with rapid rhino. Pt seen and examined at bedside. No acute events overnight. Balloon was deflated yesterday with no episodes of bleeding since. Packing removed today without complications. Pt denies fever, n/v, HA, sob, nasal discharge.     Vital Signs Last 24 Hrs  T(C): 36.4 (18 Oct 2017 05:20), Max: 37.2 (17 Oct 2017 13:48)  T(F): 97.6 (18 Oct 2017 05:20), Max: 99 (17 Oct 2017 13:48)  HR: 86 (18 Oct 2017 05:20) (83 - 93)  BP: 114/47 (18 Oct 2017 05:20) (107/60 - 114/47)  BP(mean): --  RR: 18 (18 Oct 2017 05:20) (18 - 19)  SpO2: 98% (18 Oct 2017 05:20) (96% - 98%)    LABS:                        8.5    5.64  )-----------( 152      ( 18 Oct 2017 07:40 )             24.9       PHYSICAL EXAM:  Gen: NAD, well-developed  Head: Normocephalic, Atraumatic  Face: no edema/erythema/fluctuance, parotid glands soft without mass  Eyes: Right Eye-  Periorbital edema and ecchymosis, decreased from previous exam, unable to open eye fully.   Left Eye, EOMI, no scleral injection  Nose: Nares bilaterally patent, no discharge, no epistaxis  Mouth: No Stridor / Drooling / Trismus.  Mucosa moist, tongue/uvula midline, oropharynx clear  Neck: Flat, supple, no lymphadenopathy, trachea midline, no masses  Resp: breathing easily, no stridor  CV: no peripheral edema/cyanosis

## 2017-10-18 NOTE — PROGRESS NOTE ADULT - ASSESSMENT
A/P: 89 F w/ orbital roof and medial wall fractures OD s/p fall. Exam is stable compared to initial exam. Primary team was concerned about possible diplopia, but patient did not endorse such symptoms on re-evaluation today.   - mgmt as per primary    Follow-Up:  Patient should follow up with his/her ophthalmologist or with [facility] Ophthalmology within 1 week of after discharge.  [address] [place] [address].  [place] [address]  [phone]    S/D/W Dr Combs (attending) A/P: 89 F w/ orbital roof and medial wall fractures OD s/p fall. Exam is stable compared to initial exam. Primary team was concerned about possible diplopia, but patient did not endorse such symptoms on re-evaluation today, EOMs are full, no evidence of entrapment clinically or on imaging.  - mgmt as per primary    Follow-Up:  Patient should follow up with his/her ophthalmologist or with [facility] Ophthalmology within 1 week of after discharge.  [address] [facility] [place] [address] [phone]

## 2017-10-18 NOTE — PROGRESS NOTE ADULT - SUBJECTIVE AND OBJECTIVE BOX
[facility] Ophthalmology Follow Up Note    HPI: Ophthalmology was called to evaluate for monocular diplopia.     Mood and Affect Appropriate ( x ),  Oriented to Time, Place, and Person x 3 ( x )    Ophthalmology Exam    Visual acuity (sc): 20/40 OU  Pupils: PERRL OU, no APD  Ttono: STP OU  Extraocular movements (EOMs): Full OU, no pain, no diplopia    Pen Light Exam (PLE)  External: sutured skin laceration above right brow OU  Lids/Lashes/Lacrimal Ducts: periorbital hematoma OD, flat OS    Sclera/Conjunctiva: CHRISTIANO OD, W+Q OS  Cornea: Cl OU  Anterior Chamber: D+F OU    Iris:  Flat OU  Lens:  Cl OU [facility] Ophthalmology Follow Up Note    HPI: Ophthalmology was called to evaluate for monocular diplopia, however has no complaints of diplopia.  No change in vision, no pain, no new redness.    Mood and Affect Appropriate ( x ),  Oriented to Time, Place, and Person x 3 ( x )    Ophthalmology Exam    Visual acuity (sc): 20/40 OU  Pupils: PERRL OU, no APD  Ttono: STP OU  Extraocular movements (EOMs): Full OU, no pain, no diplopia    Pen Light Exam (PLE)  External: sutured skin laceration above right brow OU  Lids/Lashes/Lacrimal Ducts: periorbital hematoma OD, flat OS    Sclera/Conjunctiva: CHRISTIANO OD, W+Q OS  Cornea: Cl OU  Anterior Chamber: D+F OU    Iris:  Flat OU  Lens:  Cl OU

## 2017-10-18 NOTE — PROGRESS NOTE ADULT - SUBJECTIVE AND OBJECTIVE BOX
Authored by Endy Lugo, DO: Beeper#[phone]    REYNA CLAUDIO  89y  Female      Patient is a 89y old  Female who presents with a chief complaint of Fall and multiple injuries (16 Oct 2017 00:18)      INTERVAL HPI/OVERNIGHT EVENTS:  Per RN, paulmarisol sundowned overnight, attempted to get out of bed.  This AM patient pleasant but thought that she was at her assisted living.  Denies any pain or other complaints.     Vital Signs Last 24 Hrs  T(C): 36.4 (18 Oct 2017 05:20), Max: 37.2 (17 Oct 2017 13:48)  T(F): 97.6 (18 Oct 2017 05:20), Max: 99 (17 Oct 2017 13:48)  HR: 86 (18 Oct 2017 05:20) (83 - 93)  BP: 114/47 (18 Oct 2017 05:20) (107/60 - 114/47)  BP(mean): --  RR: 18 (18 Oct 2017 05:20) (18 - 19)  SpO2: 98% (18 Oct 2017 05:20) (96% - 98%)    PHYSICAL EXAM:  GENERAL: NAD  HEENT: right eye soft tissue swelled shut with right sided facial swelling and ecchymosis, slightly improved..  EOMI   CARDS: +S1S2. rrr.  No m/r/g  RESP: CTA b/l.  No r/r/w  ABD: Soft.  NT/ND.  +BS  EXT: No c/c/e.  movign b/l upper extemiteis spontaneously.   SKIN : no flank or thigh ecchymosis    Consultant(s) Notes Reviewed:  [x ] YES  [ ] NO  Care Discussed with Consultants/Other Providers [ x] YES  [ ] NO: ENT  LABS:                                   8.5    5.64  )-----------( 152      ( 18 Oct 2017 07:40 )             24.9   10-18    134<L>  |  99  |  9   ----------------------------<  123<H>  3.7   |  23  |  0.46<L>    Ca    8.1<L>      18 Oct 2017 07:30  Phos  2.3     10-18  Mg     2.0     10-17           PTT - ( 15 Oct 2017 17:03 )  PTT:29.1 sec          RADIOLOGY & ADDITIONAL TESTS:

## 2017-10-19 ENCOUNTER — TRANSCRIPTION ENCOUNTER (OUTPATIENT)
Age: 82
End: 2017-10-19

## 2017-10-19 LAB
ANION GAP SERPL CALC-SCNC: 12 MMOL/L — SIGNIFICANT CHANGE UP (ref 5–17)
BUN SERPL-MCNC: 9 MG/DL — SIGNIFICANT CHANGE UP (ref 7–23)
CALCIUM SERPL-MCNC: 8.4 MG/DL — SIGNIFICANT CHANGE UP (ref 8.4–10.5)
CHLORIDE SERPL-SCNC: 103 MMOL/L — SIGNIFICANT CHANGE UP (ref 96–108)
CO2 SERPL-SCNC: 22 MMOL/L — SIGNIFICANT CHANGE UP (ref 22–31)
CREAT SERPL-MCNC: 0.58 MG/DL — SIGNIFICANT CHANGE UP (ref 0.5–1.3)
GLUCOSE SERPL-MCNC: 103 MG/DL — HIGH (ref 70–99)
HCT VFR BLD CALC: 25.3 % — LOW (ref 34.5–45)
HGB BLD-MCNC: 8.6 G/DL — LOW (ref 11.5–15.5)
MCHC RBC-ENTMCNC: 34 GM/DL — SIGNIFICANT CHANGE UP (ref 32–36)
MCHC RBC-ENTMCNC: 34.8 PG — HIGH (ref 27–34)
MCV RBC AUTO: 102 FL — HIGH (ref 80–100)
PHOSPHATE SERPL-MCNC: 2.7 MG/DL — SIGNIFICANT CHANGE UP (ref 2.5–4.5)
PLATELET # BLD AUTO: 174 K/UL — SIGNIFICANT CHANGE UP (ref 150–400)
POTASSIUM SERPL-MCNC: 3.8 MMOL/L — SIGNIFICANT CHANGE UP (ref 3.5–5.3)
POTASSIUM SERPL-SCNC: 3.8 MMOL/L — SIGNIFICANT CHANGE UP (ref 3.5–5.3)
RBC # BLD: 2.47 M/UL — LOW (ref 3.8–5.2)
RBC # FLD: 11.9 % — SIGNIFICANT CHANGE UP (ref 10.3–14.5)
SODIUM SERPL-SCNC: 137 MMOL/L — SIGNIFICANT CHANGE UP (ref 135–145)
WBC # BLD: 6 K/UL — SIGNIFICANT CHANGE UP (ref 3.8–10.5)
WBC # FLD AUTO: 6 K/UL — SIGNIFICANT CHANGE UP (ref 3.8–10.5)

## 2017-10-19 PROCEDURE — 99233 SBSQ HOSP IP/OBS HIGH 50: CPT

## 2017-10-19 RX ORDER — BACITRACIN ZINC 500 UNIT/G
1 OINTMENT IN PACKET (EA) TOPICAL
Qty: 0 | Refills: 0 | COMMUNITY
Start: 2017-10-19

## 2017-10-19 RX ORDER — QUETIAPINE FUMARATE 200 MG/1
12.5 TABLET, FILM COATED ORAL
Qty: 0 | Refills: 0 | COMMUNITY
Start: 2017-10-19

## 2017-10-19 RX ORDER — ASPIRIN/CALCIUM CARB/MAGNESIUM 324 MG
1 TABLET ORAL
Qty: 0 | Refills: 0 | COMMUNITY

## 2017-10-19 RX ORDER — SODIUM CHLORIDE 0.65 %
1 AEROSOL, SPRAY (ML) NASAL
Qty: 0 | Refills: 0 | COMMUNITY
Start: 2017-10-19

## 2017-10-19 RX ORDER — ACETAMINOPHEN 500 MG
0 TABLET ORAL
Qty: 0 | Refills: 0 | COMMUNITY

## 2017-10-19 RX ORDER — PETROLATUM,WHITE
1 JELLY (GRAM) TOPICAL
Qty: 0 | Refills: 0 | COMMUNITY
Start: 2017-10-19

## 2017-10-19 RX ORDER — OXYCODONE HYDROCHLORIDE 5 MG/1
1 TABLET ORAL
Qty: 0 | Refills: 0 | COMMUNITY
Start: 2017-10-19

## 2017-10-19 RX ORDER — PREGABALIN 225 MG/1
0 CAPSULE ORAL
Qty: 0 | Refills: 0 | COMMUNITY

## 2017-10-19 RX ORDER — ACETAMINOPHEN 500 MG
2 TABLET ORAL
Qty: 0 | Refills: 0 | COMMUNITY
Start: 2017-10-19

## 2017-10-19 RX ADMIN — QUETIAPINE FUMARATE 12.5 MILLIGRAM(S): 200 TABLET, FILM COATED ORAL at 18:08

## 2017-10-19 RX ADMIN — Medication 1 DROP(S): at 14:50

## 2017-10-19 RX ADMIN — Medication 1 APPLICATION(S): at 17:47

## 2017-10-19 RX ADMIN — Medication 1 APPLICATION(S): at 08:55

## 2017-10-19 RX ADMIN — Medication 1 APPLICATION(S): at 05:41

## 2017-10-19 RX ADMIN — Medication 1 APPLICATION(S): at 19:53

## 2017-10-19 RX ADMIN — Medication 1 DROP(S): at 05:41

## 2017-10-19 RX ADMIN — Medication 1 DROP(S): at 21:42

## 2017-10-19 NOTE — DISCHARGE NOTE ADULT - ADDITIONAL INSTRUCTIONS
Patient should follow up his/her ophthalmologist or in the [facility] Ophthalmology Practice within 1 week of discharge, sooner if symptoms worsen or change. at [address] [place] [address]. [place] [address],[phone] Patient should follow up his/her ophthalmologist or in the [facility] Ophthalmology Practice within 1 week of discharge, sooner if symptoms worsen or change. at [address] [place] [address]. [place] [address],[phone]    Please follow up with Plastics, Dr. Rincon in 1 week  Follow up with Dr. Stone, neurosurgery in 2 weeks. Patient should follow up his/her ophthalmologist or in the [facility] Ophthalmology Practice within 1 week of discharge, sooner if symptoms worsen or change. at [address] [facility] [place] [address],[phone]  Please check BMP for the potassium in 3 days  Please follow up with Plastics, Dr. Rincon in 1 week  Follow up with Dr. Stone, neurosurgery in 2 weeks.

## 2017-10-19 NOTE — DISCHARGE NOTE ADULT - CARE PLAN
Principal Discharge DX:	Fall, initial encounter  Goal:	NO falls  Instructions for follow-up, activity and diet:	continue PT for strength and balance training  Secondary Diagnosis:	Closed fracture of left side of maxilla, initial encounter  Instructions for follow-up, activity and diet:	Follow up with plastic surgery  Secondary Diagnosis:	Encephalopathy acute  Instructions for follow-up, activity and diet:	continue seroquel in the evening  Secondary Diagnosis:	Closed fracture of head of right humerus, initial encounter  Instructions for follow-up, activity and diet:	R arm in splint  Follow up with Orthopedic in 2 weeks  Secondary Diagnosis:	Orbital fracture, closed, initial encounter  Instructions for follow-up, activity and diet:	Follow up with plasticsurgery  Secondary Diagnosis:	Laceration of scalp, initial encounter  Instructions for follow-up, activity and diet:	sutures to be removed in 5 days  Secondary Diagnosis:	Subarachnoid bleed  Instructions for follow-up, activity and diet:	Neuro surgery follow up in 2 weeks Principal Discharge DX:	Fall, initial encounter  Goal:	NO falls  Instructions for follow-up, activity and diet:	You had a fall at your assisted living situation which resulted in multiple facial injuries.  Please go to rehab for continued stability.  Please follow up with your PMD in 2 weeks for follow up.  Secondary Diagnosis:	Closed fracture of left side of maxilla, initial encounter  Instructions for follow-up, activity and diet:	You were seen by plastic surgery for orbital and maxillary facial fracture, cleared for discharge.  Please follow up with Dr. Rincon within 1 week of discharge for follow up.  Secondary Diagnosis:	Encephalopathy acute  Instructions for follow-up, activity and diet:	continue seroquel in the evening.  Please have frequent re-orientation at night.  Follow up with your pmd in 2 weeks for assessment of seroquel effectivenes  Secondary Diagnosis:	Orbital fracture, closed, initial encounter  Instructions for follow-up, activity and diet:	R arm in splint  Follow up with Dr. Rincon in 1 week  Secondary Diagnosis:	Laceration of scalp, initial encounter  Instructions for follow-up, activity and diet:	Follow up with plasticsurgery  Secondary Diagnosis:	Subarachnoid bleed  Instructions for follow-up, activity and diet:	sutures to be removed in 5 days  Instructions for follow-up, activity and diet:	You were seen by Dr. Stone in the hospital and cleared from a neurosurgical standpoint.  Please follow up with DR. Stone in 2 weeks for follow up,his information is listed below.

## 2017-10-19 NOTE — DISCHARGE NOTE ADULT - MEDICATION SUMMARY - MEDICATIONS TO TAKE
I will START or STAY ON the medications listed below when I get home from the hospital:    acetaminophen 325 mg oral tablet  -- 2 tab(s) by mouth every 6 hours, As needed, Moderate Pain (4 - 6)  -- Indication: For moderate pain    oxyCODONE 5 mg oral tablet  -- 1 tab(s) by mouth every 6 hours, As needed, Moderate Pain (4 - 6)  -- Indication: For Severe pain    QUEtiapine  -- 12.5 milligram(s) by mouth once a day  at 7 PM  -- Indication: For Delerium    petrolatum topical ointment  -- 1 application on skin to legs  2 times a day  -- Indication: For Dry skin    bacitracin 500 units/g topical ointment  -- 1 application in each nostril 2 times a day  -- Indication: For Epistasix    sodium chloride 0.65% nasal spray  -- 1 spray(s) into nose 3 times a day  -- Indication: For Epistaxis    ocular lubricant ophthalmic solution  -- 1 drop(s) to each affected eye 3 times a day  -- Indication: For Dry eyes    Multiple Vitamins oral tablet  -- 1 tab(s) by mouth once a day  -- Indication: For Supplement    Vitamin B-12  -- 1 tab(s) by mouth once a day  -- Indication: For Supplement

## 2017-10-19 NOTE — PROGRESS NOTE ADULT - SUBJECTIVE AND OBJECTIVE BOX
Authored by Endy Lugo, DO: Beeper#[phone]    REYNA CLAUDIO  89y  Female      Patient is a 89y old  Female who presents with a chief complaint of Fall and multiple injuries (16 Oct 2017 00:18)      INTERVAL HPI/OVERNIGHT EVENTS:  Per RN, paulmarisol sundowned overnight, attempted to get out of bed.  This AM patient pleasant but thought that she was at her assisted living.  Denies any pain or other complaints.     Vital Signs Last 24 Hrs  T(C): 36.4 (18 Oct 2017 05:20), Max: 37.2 (17 Oct 2017 13:48)  T(F): 97.6 (18 Oct 2017 05:20), Max: 99 (17 Oct 2017 13:48)  HR: 86 (18 Oct 2017 05:20) (83 - 93)  BP: 114/47 (18 Oct 2017 05:20) (107/60 - 114/47)  BP(mean): --  RR: 18 (18 Oct 2017 05:20) (18 - 19)  SpO2: 98% (18 Oct 2017 05:20) (96% - 98%)    PHYSICAL EXAM:  GENERAL: NAD  HEENT: right eye soft tissue swelled shut with right sided facial swelling and ecchymosis, slightly improved..  EOMI   CARDS: +S1S2. rrr.  No m/r/g  RESP: CTA b/l.  No r/r/w  ABD: Soft.  NT/ND.  +BS  EXT: No c/c/e.  movign b/l upper extemiteis spontaneously.   SKIN : no flank or thigh ecchymosis    Consultant(s) Notes Reviewed:  [x ] YES  [ ] NO  Care Discussed with Consultants/Other Providers [ x] YES  [ ] NO: ENT  LABS:                                   8.5    5.64  )-----------( 152      ( 18 Oct 2017 07:40 )             24.9   10-18    134<L>  |  99  |  9   ----------------------------<  123<H>  3.7   |  23  |  0.46<L>    Ca    8.1<L>      18 Oct 2017 07:30  Phos  2.3     10-18  Mg     2.0     10-17           PTT - ( 15 Oct 2017 17:03 )  PTT:29.1 sec          RADIOLOGY & ADDITIONAL TESTS:

## 2017-10-19 NOTE — DISCHARGE NOTE ADULT - HOSPITAL COURSE
89F w/ dementia, on ASA p/w fall and multiple traumatic injuries. Pt states she was walking around in [place] to the food area and tripped on the carpet. She hit the right side of her head and torso when she fell. Found to have impacted non-displaced humeral fracture ;splint placed ;Ortho consulted; advised  pain control with tylenol and oxy PRN and out patient orthopedic follow ; Scalp  laceration of scalp sutured in ER; advised to return to ER in 5 days for suture removal. R insular subarachnoid bleed noted on CT head, but on re imaging bleeding had stopped. Possibly artifact .Neurosurgery recommended out patient followup.   ENT called for left anterior epistaxis controlled with gelfoam packing; Received short course Zithromax while packing remained; packings   later removed by ENT ,no more bleeding noted.    Plastics consulted for  right superior and medial orbital wall fractures. Given the lack of changes to visual acuity and extra-ocular muscle entrapment, low concern for retrobulbar hematoma, and minimal displacement of fractured bones, states no indication for operative intervention.   Seen by Opthalmology ; EOMs intact. IOP wnl. Globes intact. VA equal in both eyes. Subconjunctival hemorrhage.   Advised  ice packs q1-2 hr OD;  Started  low dose seroquel for delirium on underlying dementia;   - 89F w/ dementia, on ASA p/w fall and multiple traumatic injuries. Pt states she was [place] around [place] to [place] area and tripped on the carpet. She hit the right side of her head and torso when she fell.; Scalp  laceration of scalp sutured in ER; advised to return to ER in 5 days for suture removal. R insular subarachnoid bleed noted on CT head, but on re imaging bleeding had stopped. Possibly artifact .Neurosurgery recommended out patient followup.   ENT called for left anterior epistaxis controlled with gelfoam packing; Received short course Zithromax while packing remained; packings   later removed by ENT ,no more bleeding noted.    Plastics consulted for  right superior and medial orbital wall fractures. Given the lack of changes to visual acuity and extra-ocular muscle entrapment, low concern for retrobulbar hematoma, and minimal displacement of fractured bones, states no indication for operative intervention.   Seen by Opthalmology ; EOMs intact. IOP wnl. Globes intact. VA equal in both eyes. Subconjunctival hemorrhage.   Advised  ice packs q1-2 hr OD;  Started  low dose seroquel for delirium on underlying dementia; Patietn clinically improved cleared by neurosurgery, plastics for discharge.  Upon further review of chart and  Xray, there were no humerus fractures noted on xray official reads.   -

## 2017-10-19 NOTE — DISCHARGE NOTE ADULT - PLAN OF CARE
Follow up with plasticsurgery sutures to be removed in 5 days Neuro surgery follow up in 2 weeks NO falls continue PT for strength and balance training Follow up with plastic surgery continue seroquel in the evening R arm in splint  Follow up with Orthopedic in 2 weeks You had a fall at your assisted living situation which resulted in multiple facial injuries.  Please go to rehab for continued stability.  Please follow up with your PMD in 2 weeks for follow up. You were seen by plastic surgery for orbital and maxillary facial fracture, cleared for discharge.  Please follow up with Dr. Rincon within 1 week of discharge for follow up. continue seroquel in the evening.  Please have frequent re-orientation at night.  Follow up with your pmd in 2 weeks for assessment of seroquel effectivenes R arm in splint  Follow up with Dr. Rincon in 1 week You were seen by Dr. Stone in the hospital and cleared from a neurosurgical standpoint.  Please follow up with DR. Stone in 2 weeks for follow up,his information is listed below.

## 2017-10-19 NOTE — DISCHARGE NOTE ADULT - CARE PROVIDERS DIRECT ADDRESSES
,[email or web address]

## 2017-10-19 NOTE — DISCHARGE NOTE ADULT - SECONDARY DIAGNOSIS.
Orbital fracture, closed, initial encounter Laceration of scalp, initial encounter Subarachnoid bleed Closed fracture of left side of maxilla, initial encounter Encephalopathy acute Closed fracture of head of right humerus, initial encounter

## 2017-10-19 NOTE — DISCHARGE NOTE ADULT - MEDICATION SUMMARY - MEDICATIONS TO STOP TAKING
I will STOP taking the medications listed below when I get home from the hospital:    ibuprofen 200 mg oral tablet  -- 1 tab(s) by mouth every 6 hours    aspirin 81 mg oral tablet  -- 1 tab(s) by mouth once a day

## 2017-10-19 NOTE — DISCHARGE NOTE ADULT - CARE PROVIDER_API CALL
Keenan Stone (), Neurological Surgery  [address] [place] [address]  Phone: [phone]  Fax: [phone]    Chauncey Elias), Orthopaedic Surgery  [address] [facility]  [address]  [place] [address]  Phone: [phone]  Fax: [phone]    Doc Rincon (MD), ColonRectal Surgery; Plastic Surgery; Surgery; Surgery of the Hand  [address] [facility] [address]  [place] [address]  Phone: [phone]  Fax: [phone]

## 2017-10-20 PROCEDURE — 99233 SBSQ HOSP IP/OBS HIGH 50: CPT

## 2017-10-20 RX ADMIN — Medication 1 APPLICATION(S): at 17:44

## 2017-10-20 RX ADMIN — Medication 1 APPLICATION(S): at 10:04

## 2017-10-20 RX ADMIN — QUETIAPINE FUMARATE 12.5 MILLIGRAM(S): 200 TABLET, FILM COATED ORAL at 19:47

## 2017-10-20 RX ADMIN — Medication 1 APPLICATION(S): at 21:48

## 2017-10-20 RX ADMIN — Medication 1 DROP(S): at 05:58

## 2017-10-20 RX ADMIN — Medication 1 APPLICATION(S): at 05:56

## 2017-10-20 RX ADMIN — Medication 1 DROP(S): at 14:48

## 2017-10-21 DIAGNOSIS — D50.0 IRON DEFICIENCY ANEMIA SECONDARY TO BLOOD LOSS (CHRONIC): ICD-10-CM

## 2017-10-21 PROCEDURE — 99233 SBSQ HOSP IP/OBS HIGH 50: CPT

## 2017-10-21 RX ADMIN — Medication 1000 MILLIGRAM(S): at 07:30

## 2017-10-21 RX ADMIN — Medication 1 APPLICATION(S): at 17:40

## 2017-10-21 RX ADMIN — Medication 1 DROP(S): at 05:21

## 2017-10-21 RX ADMIN — Medication 1 DROP(S): at 13:10

## 2017-10-21 RX ADMIN — Medication 1 APPLICATION(S): at 09:31

## 2017-10-21 RX ADMIN — Medication 1 APPLICATION(S): at 05:21

## 2017-10-21 NOTE — PROVIDER CONTACT NOTE (OTHER) - ACTION/TREATMENT ORDERED:
PA notified and aware.  No new orders at this time.  Continue to monitor patient
will continue to encourage void with bedpan and monitor.  pt combative and aggitated, no straight Cath at this time.
will monitor for void, repeat bladder scan at 4am

## 2017-10-21 NOTE — PROGRESS NOTE ADULT - ASSESSMENT
89 yo female PMHx Dementia (per daughter is not on ASA) presents s/p mechanical fall c/b orbital and maxillary fractures as well as SAH.

## 2017-10-21 NOTE — PROVIDER CONTACT NOTE (OTHER) - ASSESSMENT
A&O x1-2.  Patient took only small sip of water at 1930 last night, did not drink during shift afterwards (slept all night).  Patient with no complaints of discomfort/pain.  Patient normally urinates with bedpan or walking to bathroom.  No distention noted.  Bladder scan: 28 mL
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
## 2017-10-21 NOTE — PROGRESS NOTE ADULT - SUBJECTIVE AND OBJECTIVE BOX
Patient is a 89y old  Female who presents with a chief complaint of Fall and multiple injuries (19 Oct 2017 13:42)    SUBJECTIVE / OVERNIGHT EVENTS: Patient seen and examined. No acute overnight events, no subjective complaints. Patient in good spirits.     MEDICATIONS  (STANDING):  AQUAPHOR (petrolatum Ointment) 1 Application(s) Topical two times a day  artificial  tears Solution 1 Drop(s) Both EYES three times a day  BACItracin   Ointment 1 Application(s) Topical two times a day  QUEtiapine 12.5 milliGRAM(s) Oral <User Schedule>    MEDICATIONS  (PRN):  acetaminophen   Tablet. 650 milliGRAM(s) Oral every 6 hours PRN Moderate Pain (4 - 6)  oxyCODONE    IR 5 milliGRAM(s) Oral every 6 hours PRN Moderate Pain (4 - 6)  sodium chloride 0.65% Nasal 1 Spray(s) Both Nostrils three times a day PRN Nasal Congestion    Vital Signs Last 24 Hrs  T(F): 98.5 (21 Oct 2017 12:59), Max: 98.5 (21 Oct 2017 12:59)  HR: 88 (21 Oct 2017 12:59) (83 - 88)  BP: 112/69 (21 Oct 2017 12:59) (103/63 - 112/69)  RR: 19 (21 Oct 2017 12:59) (18 - 19)  SpO2: 95% (21 Oct 2017 12:59) (95% - 97%)    I&O's Summary    20 Oct 2017 07:01  -  21 Oct 2017 07:00  --------------------------------------------------------  IN: 850 mL / OUT: 100 mL / NET: 750 mL    21 Oct 2017 07:01  -  21 Oct 2017 14:41  --------------------------------------------------------  IN: 900 mL / OUT: 400 mL / NET: 500 mL    PHYSICAL EXAM:  GEN: elderly female, sitting up in bed, in NAD  EYES: PERRL, anicteric, EOMI  HEAD: right eye soft tissue swollen shut with right sided facial swelling and ecchymosis  RESPI: no accessory muscle use, patient able to speak in full sentences, B/L air entry, CTAB  CARDIO: no murmurs appreciated on auscultation, no LE edema B/L  ABD: soft, NT to deep palpation in all quadrants, ND, +BS  NEURO: A&Ox3  EXT: pt able to move all extremities spontaneously   VASC: peripheral pulses palpated B/L

## 2017-10-22 DIAGNOSIS — M79.605 PAIN IN LEFT LEG: ICD-10-CM

## 2017-10-22 LAB
ANION GAP SERPL CALC-SCNC: 11 MMOL/L — SIGNIFICANT CHANGE UP (ref 5–17)
BUN SERPL-MCNC: 10 MG/DL — SIGNIFICANT CHANGE UP (ref 7–23)
CALCIUM SERPL-MCNC: 8.2 MG/DL — LOW (ref 8.4–10.5)
CHLORIDE SERPL-SCNC: 96 MMOL/L — SIGNIFICANT CHANGE UP (ref 96–108)
CO2 SERPL-SCNC: 24 MMOL/L — SIGNIFICANT CHANGE UP (ref 22–31)
CREAT SERPL-MCNC: 0.61 MG/DL — SIGNIFICANT CHANGE UP (ref 0.5–1.3)
GLUCOSE SERPL-MCNC: 116 MG/DL — HIGH (ref 70–99)
HCT VFR BLD CALC: 25.1 % — LOW (ref 34.5–45)
HGB BLD-MCNC: 8.7 G/DL — LOW (ref 11.5–15.5)
MCHC RBC-ENTMCNC: 34.5 GM/DL — SIGNIFICANT CHANGE UP (ref 32–36)
MCHC RBC-ENTMCNC: 34.8 PG — HIGH (ref 27–34)
MCV RBC AUTO: 101 FL — HIGH (ref 80–100)
PLATELET # BLD AUTO: 268 K/UL — SIGNIFICANT CHANGE UP (ref 150–400)
POTASSIUM SERPL-MCNC: 3.8 MMOL/L — SIGNIFICANT CHANGE UP (ref 3.5–5.3)
POTASSIUM SERPL-SCNC: 3.8 MMOL/L — SIGNIFICANT CHANGE UP (ref 3.5–5.3)
RBC # BLD: 2.49 M/UL — LOW (ref 3.8–5.2)
RBC # FLD: 11.8 % — SIGNIFICANT CHANGE UP (ref 10.3–14.5)
SODIUM SERPL-SCNC: 131 MMOL/L — LOW (ref 135–145)
WBC # BLD: 6.7 K/UL — SIGNIFICANT CHANGE UP (ref 3.8–10.5)
WBC # FLD AUTO: 6.7 K/UL — SIGNIFICANT CHANGE UP (ref 3.8–10.5)

## 2017-10-22 PROCEDURE — 93971 EXTREMITY STUDY: CPT | Mod: 26

## 2017-10-22 PROCEDURE — 99233 SBSQ HOSP IP/OBS HIGH 50: CPT

## 2017-10-22 RX ADMIN — Medication 1 DROP(S): at 13:04

## 2017-10-22 RX ADMIN — QUETIAPINE FUMARATE 12.5 MILLIGRAM(S): 200 TABLET, FILM COATED ORAL at 18:26

## 2017-10-22 RX ADMIN — Medication 1 APPLICATION(S): at 18:26

## 2017-10-22 RX ADMIN — Medication 1 APPLICATION(S): at 21:11

## 2017-10-22 RX ADMIN — Medication 1 APPLICATION(S): at 09:51

## 2017-10-22 RX ADMIN — Medication 1 APPLICATION(S): at 05:50

## 2017-10-22 NOTE — PROGRESS NOTE ADULT - PROBLEM SELECTOR PLAN 1
- new complaints of LLE swelling/pain including calf edema/TTP and heel erythema - LLE Duplex ordered

## 2017-10-22 NOTE — PROGRESS NOTE ADULT - SUBJECTIVE AND OBJECTIVE BOX
Patient is a 89y old  Female who presents with a chief complaint of Fall and multiple injuries (19 Oct 2017 13:42)    SUBJECTIVE / OVERNIGHT EVENTS: Patient seen and examined. No acute overnight events. Patient endorsing continued left heel pain, but also left calf pain, with mild swelling of the LLE as compared to RLE. No other complaints, including denial of chest pain, palpitations, or difficulty breathing.    MEDICATIONS  (STANDING):  AQUAPHOR (petrolatum Ointment) 1 Application(s) Topical two times a day  artificial  tears Solution 1 Drop(s) Both EYES three times a day  BACItracin   Ointment 1 Application(s) Topical two times a day  QUEtiapine 12.5 milliGRAM(s) Oral <User Schedule>    MEDICATIONS  (PRN):  acetaminophen   Tablet. 650 milliGRAM(s) Oral every 6 hours PRN Moderate Pain (4 - 6)  oxyCODONE    IR 5 milliGRAM(s) Oral every 6 hours PRN Moderate Pain (4 - 6)  sodium chloride 0.65% Nasal 1 Spray(s) Both Nostrils three times a day PRN Nasal Congestion    Vital Signs Last 24 Hrs  T(F): 98.6 (22 Oct 2017 04:42), Max: 98.6 (22 Oct 2017 04:42)  HR: 79 (22 Oct 2017 04:42) (79 - 90)  BP: 105/54 (22 Oct 2017 04:42) (105/54 - 116/65)  RR: 18 (22 Oct 2017 04:42) (18 - 19)  SpO2: 98% (22 Oct 2017 04:42) (95% - 98%)    I&O's Summary  21 Oct 2017 07:01  -  22 Oct 2017 07:00  --------------------------------------------------------  IN: 1140 mL / OUT: 765 mL / NET: 375 mL    22 Oct 2017 07:01  -  22 Oct 2017 12:14  --------------------------------------------------------  IN: 120 mL / OUT: 300 mL / NET: -180 mL    PHYSICAL EXAM:  GEN: elderly female, sitting up in bed, in NAD  EYES: PERRL, anicteric, EOMI  HEAD: right eye soft tissue swollen shut with right sided facial swelling and ecchymosis  RESPI: no accessory muscle use, patient able to speak in full sentences, B/L air entry, CTAB  CARDIO: no murmurs appreciated on auscultation  ABD: soft, NT to deep palpation in all quadrants, ND, +BS  NEURO: A&Ox3  EXT: pt able to move all extremities spontaneously, LLE slightly > RLE w/ TTP of left calf and left heel + left heel erythema  VASC: peripheral pulses palpated B/L    LABS:                      8.7    6.7   )-----------( 268      ( 22 Oct 2017 06:29 )             25.1     10-22    131<L>  |  96  |  10  ----------------------------<  116<H>  3.8   |  24  |  0.61    Ca    8.2<L>      22 Oct 2017 06:29

## 2017-10-23 LAB
ANION GAP SERPL CALC-SCNC: 13 MMOL/L — SIGNIFICANT CHANGE UP (ref 5–17)
BUN SERPL-MCNC: 12 MG/DL — SIGNIFICANT CHANGE UP (ref 7–23)
CALCIUM SERPL-MCNC: 8.4 MG/DL — SIGNIFICANT CHANGE UP (ref 8.4–10.5)
CHLORIDE SERPL-SCNC: 96 MMOL/L — SIGNIFICANT CHANGE UP (ref 96–108)
CO2 SERPL-SCNC: 23 MMOL/L — SIGNIFICANT CHANGE UP (ref 22–31)
CREAT SERPL-MCNC: 0.59 MG/DL — SIGNIFICANT CHANGE UP (ref 0.5–1.3)
GLUCOSE SERPL-MCNC: 99 MG/DL — SIGNIFICANT CHANGE UP (ref 70–99)
POTASSIUM SERPL-MCNC: 3.5 MMOL/L — SIGNIFICANT CHANGE UP (ref 3.5–5.3)
POTASSIUM SERPL-SCNC: 3.5 MMOL/L — SIGNIFICANT CHANGE UP (ref 3.5–5.3)
SODIUM SERPL-SCNC: 132 MMOL/L — LOW (ref 135–145)

## 2017-10-23 PROCEDURE — 99233 SBSQ HOSP IP/OBS HIGH 50: CPT

## 2017-10-23 RX ADMIN — Medication 1 DROP(S): at 21:27

## 2017-10-23 RX ADMIN — Medication 1 DROP(S): at 05:26

## 2017-10-23 RX ADMIN — Medication 650 MILLIGRAM(S): at 16:09

## 2017-10-23 RX ADMIN — Medication 1 APPLICATION(S): at 09:49

## 2017-10-23 RX ADMIN — Medication 1 APPLICATION(S): at 20:49

## 2017-10-23 RX ADMIN — Medication 1 APPLICATION(S): at 05:27

## 2017-10-23 RX ADMIN — Medication 1 APPLICATION(S): at 17:15

## 2017-10-23 RX ADMIN — Medication 1 DROP(S): at 13:40

## 2017-10-23 RX ADMIN — Medication 650 MILLIGRAM(S): at 16:30

## 2017-10-23 RX ADMIN — QUETIAPINE FUMARATE 12.5 MILLIGRAM(S): 200 TABLET, FILM COATED ORAL at 20:48

## 2017-10-23 NOTE — PROGRESS NOTE ADULT - PROBLEM SELECTOR PLAN 7
patient not on any medicvaitons.  -at this time, frequent re-orientation.
- continue with seroquel, re-orientation prior to any further changes to Rx
- likely mechanical in nature, no further inpatient workup
- likely mechanical in nature, no further inpatient workup
SCD
SCD
continuew tih seroquel, re-orientation
continuew tih seroquel, re-orientation

## 2017-10-23 NOTE — PROGRESS NOTE ADULT - ASSESSMENT
89 yo female PMHx Dementia (per daughter is not on ASA) presents s/p mechanical fall c/b orbital and maxillary fractures as well as SAH.

## 2017-10-23 NOTE — PROGRESS NOTE ADULT - PROBLEM SELECTOR PLAN 8
SCD
- continue with Seroquel, re-orientation prior to any further changes to Rx
- continue with Seroquel, re-orientation prior to any further changes to Rx
SCD
SCD

## 2017-10-23 NOTE — PROGRESS NOTE ADULT - PROBLEM SELECTOR PROBLEM 8
Prophylactic measure
Dementia without behavioral disturbance, unspecified dementia type
Dementia without behavioral disturbance, unspecified dementia type
Prophylactic measure
Prophylactic measure

## 2017-10-23 NOTE — PROGRESS NOTE ADULT - PROBLEM SELECTOR PROBLEM 7
Dementia without behavioral disturbance, unspecified dementia type
Fall, initial encounter
Fall, initial encounter
Prophylactic measure
Prophylactic measure

## 2017-10-23 NOTE — PROGRESS NOTE ADULT - SUBJECTIVE AND OBJECTIVE BOX
Patient is a 89y old  Female who presents with a chief complaint of Fall and multiple injuries (19 Oct 2017 13:42)    SUBJECTIVE / OVERNIGHT EVENTS:  c/o pain in left upper extremity, had a fall, No HA      MEDICATIONS  (STANDING):  AQUAPHOR (petrolatum Ointment) 1 Application(s) Topical two times a day  artificial  tears Solution 1 Drop(s) Both EYES three times a day  BACItracin   Ointment 1 Application(s) Topical two times a day  QUEtiapine 12.5 milliGRAM(s) Oral <User Schedule>    MEDICATIONS  (PRN):  acetaminophen   Tablet. 650 milliGRAM(s) Oral every 6 hours PRN Moderate Pain (4 - 6)  oxyCODONE    IR 5 milliGRAM(s) Oral every 6 hours PRN Moderate Pain (4 - 6)  sodium chloride 0.65% Nasal 1 Spray(s) Both Nostrils three times a day PRN Nasal Congestion      Vital Signs Last 24 Hrs  T(C): 37 (23 Oct 2017 04:43), Max: 37 (23 Oct 2017 04:43)  T(F): 98.6 (23 Oct 2017 04:43), Max: 98.6 (23 Oct 2017 04:43)  HR: 88 (23 Oct 2017 04:43) (86 - 89)  BP: 108/67 (23 Oct 2017 04:43) (108/67 - 125/55)  BP(mean): --  RR: 18 (23 Oct 2017 04:43) (18 - 18)  SpO2: 96% (23 Oct 2017 04:43) (96% - 99%)  CAPILLARY BLOOD GLUCOSE      PHYSICAL EXAM:-  GENERAL: NAD, well-developed  EYES: EOMI, PERRLA, conjunctiva and sclera clear  NECK: Supple, No JVD, no thyromegaly  CHEST/LUNG: Clear to auscultation bilaterally; No wheeze  HEART: Regular rate and rhythm; S1, S2 audible, No murmurs, rubs, or gallops  ABDOMEN: Soft, Nontender, Nondistended; Bowel sounds present  EXTREMITIES:  2+ Peripheral Pulses, No clubbing, cyanosis, or edema  NEURO: AAOx3, no focal deficit      LABS:                        8.7    6.7   )-----------( 268      ( 22 Oct 2017 06:29 )             25.1     10-23    132<L>  |  96  |  12  ----------------------------<  99  3.5   |  23  |  0.59    Ca    8.4      23 Oct 2017 06:10      RADIOLOGY & ADDITIONAL TESTS:    Imaging Personally Reviewed: CXR, CT head, X-ray   Consultant(s) Notes Reviewed:  Neuro Surgery Trauma

## 2017-10-24 VITALS
SYSTOLIC BLOOD PRESSURE: 112 MMHG | RESPIRATION RATE: 18 BRPM | HEART RATE: 85 BPM | TEMPERATURE: 98 F | DIASTOLIC BLOOD PRESSURE: 54 MMHG | OXYGEN SATURATION: 93 %

## 2017-10-24 DIAGNOSIS — D63.8 ANEMIA IN OTHER CHRONIC DISEASES CLASSIFIED ELSEWHERE: ICD-10-CM

## 2017-10-24 LAB
ANION GAP SERPL CALC-SCNC: 14 MMOL/L — SIGNIFICANT CHANGE UP (ref 5–17)
BUN SERPL-MCNC: 13 MG/DL — SIGNIFICANT CHANGE UP (ref 7–23)
CALCIUM SERPL-MCNC: 9.1 MG/DL — SIGNIFICANT CHANGE UP (ref 8.4–10.5)
CHLORIDE SERPL-SCNC: 95 MMOL/L — LOW (ref 96–108)
CO2 SERPL-SCNC: 24 MMOL/L — SIGNIFICANT CHANGE UP (ref 22–31)
CREAT SERPL-MCNC: 0.66 MG/DL — SIGNIFICANT CHANGE UP (ref 0.5–1.3)
GLUCOSE SERPL-MCNC: 143 MG/DL — HIGH (ref 70–99)
MAGNESIUM SERPL-MCNC: 2.2 MG/DL — SIGNIFICANT CHANGE UP (ref 1.6–2.6)
PHOSPHATE SERPL-MCNC: 2.8 MG/DL — SIGNIFICANT CHANGE UP (ref 2.5–4.5)
POTASSIUM SERPL-MCNC: 3.4 MMOL/L — LOW (ref 3.5–5.3)
POTASSIUM SERPL-SCNC: 3.4 MMOL/L — LOW (ref 3.5–5.3)
SODIUM SERPL-SCNC: 133 MMOL/L — LOW (ref 135–145)

## 2017-10-24 PROCEDURE — 99239 HOSP IP/OBS DSCHRG MGMT >30: CPT

## 2017-10-24 RX ORDER — POTASSIUM CHLORIDE 20 MEQ
20 PACKET (EA) ORAL ONCE
Qty: 0 | Refills: 0 | Status: COMPLETED | OUTPATIENT
Start: 2017-10-24 | End: 2017-10-24

## 2017-10-24 RX ADMIN — Medication 1 APPLICATION(S): at 18:20

## 2017-10-24 RX ADMIN — Medication 1 APPLICATION(S): at 07:06

## 2017-10-24 RX ADMIN — QUETIAPINE FUMARATE 12.5 MILLIGRAM(S): 200 TABLET, FILM COATED ORAL at 18:20

## 2017-10-24 RX ADMIN — Medication 20 MILLIEQUIVALENT(S): at 13:39

## 2017-10-24 RX ADMIN — Medication 1 ENEMA: at 13:40

## 2017-10-24 RX ADMIN — Medication 1 DROP(S): at 13:40

## 2017-10-24 RX ADMIN — Medication 650 MILLIGRAM(S): at 02:00

## 2017-10-24 RX ADMIN — Medication 650 MILLIGRAM(S): at 01:03

## 2017-10-24 RX ADMIN — Medication 1 DROP(S): at 07:06

## 2017-10-24 NOTE — PROGRESS NOTE ADULT - PROBLEM SELECTOR PLAN 2
patietn with baseline dementia with acute sundowning, possible delirium vs. possible concussion symptoms.  -will discuss with Isabel re: adding seroquel QHS once azithromycin completed (QTc within normal range).  -frequent re-orientation.
- acute blood loss anemia in setting of trauma.  - stable Hb for 72 hrs  - B12 normal for macrocytic anemia  - repeat CBC 1 week at rehab
- outpatient follow up with Dr. Stone in 2 weeks
- outpatient follow up with NeuroSx- Dr. Stone in 2 weeks. No plan for Craniotomy
-continue with seroquel 12.5, follow up as outpatient for response with PMD.
-continue with seroquel 12.5, follow up as outpatient for response with PMD.
-initially attmept was made to call facial fracture, was initially told not to them.  Possibly wrong consultant called, recalled and gave consult to Dr. Doc Rincon through his answering service.    -await consult re: surgical consult necessary.  -pain control as needed
-plastics appreciated, no acute operation at this time  -trauma and optho appreciated, no acute intervention either  -outpatient followu p necessary.  -PT to see patient
Stable on repeat CT brain. NeuroSx saw.  - outpatient follow up with NeuroSx- Dr. Stone in 2 weeks. No plan for Craniotomy

## 2017-10-24 NOTE — PROGRESS NOTE ADULT - PROBLEM SELECTOR PROBLEM 2
Anemia due to blood loss
Encephalopathy acute
Encephalopathy acute
Orbital fracture, closed, initial encounter
Orbital fracture, closed, initial encounter
Subarachnoid bleed
Encephalopathy acute

## 2017-10-24 NOTE — PROGRESS NOTE ADULT - PROBLEM SELECTOR PROBLEM 5
Fall, initial encounter
Closed fracture of left side of maxilla, initial encounter
Epistaxis
Epistaxis
Fall, initial encounter
Fall, initial encounter
Orbital fracture, closed, initial encounter
Orbital fracture, closed, initial encounter
Dementia without behavioral disturbance, unspecified dementia type

## 2017-10-24 NOTE — PROGRESS NOTE ADULT - PROBLEM SELECTOR PLAN 3
no further inpatient worup per plastics and trauma.  Outpatient follow up
- acute blood loss anemia in setting of trauma.  - stable Hb for 72 hrs  - B12 normal for macrocytic anemia  - repeat CBC 1 week at rehab
- acute blood loss anemia in setting of trauma.  - stable Hb for 72 hrs  - B12 normal for macrocytic anemia  - repeat CBC 1 week at rehab
- continue with Seroquel 12.5, follow up as outpatient for response with PMD
no further inpatient worup per plastics and trauma.  Outpatient follow up
no further inpatient worup per plastics and trauma.  Outpatient follow up
see above, awaiting facial fracture consult
see above, awaiting facial fracture consult
Spoke to Optho, no plan for Sx  - no further inpatient workup per plastics and trauma; outpatient follow up

## 2017-10-24 NOTE — PROGRESS NOTE ADULT - PROBLEM SELECTOR PLAN 1
Pain in RLE better, No Fx  - LLE Duplex neg for DVT. Stable H/H, doubt hematoma  - Analgesics prn, PT recommended ARMOND  - spoke to Daughters at bedside.

## 2017-10-24 NOTE — PROGRESS NOTE ADULT - PROBLEM SELECTOR PLAN 5
- continue with Seroquel, re-orientation prior to any further changes to Rx
no events on telemetry for 48 hours.  Take off of telemetry
- no further inpatient workup per plastics and trauma; outpatient follow up
- no further inpatient workup per plastics and trauma; outpatient follow up
- see above
-ENT appreciated, deflated balloon today, possible D/C nasal packing tomorrow.
likely mechanical in nature, no further inpatient workup
likely mechanical in nature, no further inpatient workup
packing in place, patient on prophylactic antibiotics  -follow up ENT recommendations.

## 2017-10-24 NOTE — PROGRESS NOTE ADULT - PROBLEM SELECTOR PLAN 6
Stable Hb. Outpatient f/u CBC
Nasal Packing D/C today.  -D/C jose j
- likely mechanical in nature, no further inpatient workup
- see above
- see above
nasal packing D/c
nasal packing D/c
patient not on any medicvaitons.  -at this time, frequent re-orientation.
patient not on any medicvaitons.  -at this time, frequent re-orientation.

## 2017-10-24 NOTE — PROGRESS NOTE ADULT - PROVIDER SPECIALTY LIST ADULT
ENT
ENT
Hospitalist
Internal Medicine
Neurosurgery
Ophthalmology
ENT
Internal Medicine
Hospitalist

## 2017-10-24 NOTE — PROGRESS NOTE ADULT - PROBLEM SELECTOR PROBLEM 4
Closed fracture of left side of maxilla, initial encounter
Encephalopathy acute
Encephalopathy acute
Fall, initial encounter
Fall, initial encounter
Orbital fracture, closed, initial encounter
Fall, initial encounter

## 2017-10-24 NOTE — PROGRESS NOTE ADULT - PROBLEM SELECTOR PROBLEM 1
Epistaxis
Acute pain of left lower extremity
Acute pain of left lower extremity
Epistaxis
Subarachnoid bleed
Epistaxis
Acute pain of left lower extremity
Subarachnoid bleed

## 2017-10-24 NOTE — PROGRESS NOTE ADULT - PROBLEM SELECTOR PROBLEM 3
Orbital fracture, closed, initial encounter
Anemia due to blood loss
Anemia due to blood loss
Closed fracture of left side of maxilla, initial encounter
Closed fracture of left side of maxilla, initial encounter
Encephalopathy acute
Orbital fracture, closed, initial encounter

## 2017-10-24 NOTE — PROGRESS NOTE ADULT - SUBJECTIVE AND OBJECTIVE BOX
Patient is a 89y old  Female who presents with a chief complaint of Fall and multiple injuries       SUBJECTIVE / OVERNIGHT EVENTS:   Feels ok,  no c/o chest pain, RLE pain has been better. No HA    MEDICATIONS  (STANDING):  AQUAPHOR (petrolatum Ointment) 1 Application(s) Topical two times a day  artificial  tears Solution 1 Drop(s) Both EYES three times a day  BACItracin   Ointment 1 Application(s) Topical two times a day  potassium chloride    Tablet ER 20 milliEquivalent(s) Oral once  QUEtiapine 12.5 milliGRAM(s) Oral <User Schedule>  sodium biphosphate Rectal Enema 1 Enema Rectal once    MEDICATIONS  (PRN):  acetaminophen   Tablet. 650 milliGRAM(s) Oral every 6 hours PRN Moderate Pain (4 - 6)  sodium chloride 0.65% Nasal 1 Spray(s) Both Nostrils three times a day PRN Nasal Congestion      Vital Signs Last 24 Hrs  T(C): 36.6 (24 Oct 2017 12:10), Max: 36.8 (24 Oct 2017 04:41)  T(F): 97.8 (24 Oct 2017 12:10), Max: 98.3 (24 Oct 2017 04:41)  HR: 85 (24 Oct 2017 12:10) (84 - 85)  BP: 112/54 (24 Oct 2017 12:10) (93/56 - 112/54)  BP(mean): --  RR: 18 (24 Oct 2017 12:10) (18 - 18)  SpO2: 93% (24 Oct 2017 12:10) (93% - 97%)  CAPILLARY BLOOD GLUCOSE      PHYSICAL EXAM:-  GENERAL: NAD, well-developed  EYES: EOMI, PERRLA, conjunctiva and sclera clear  NECK: Supple, No JVD, no thyromegaly  CHEST/LUNG: Clear to auscultation bilaterally; No wheeze  HEART: Regular rate and rhythm; S1, S2 audible, No murmurs, rubs, or gallops  ABDOMEN: Soft, Nontender, Nondistended; Bowel sounds present  EXTREMITIES:  2+ Peripheral Pulses, No clubbing, cyanosis, or edema  NEURO: AAOx3, no focal deficit      LABS:    10-24    133<L>  |  95<L>  |  13  ----------------------------<  143<H>  3.4<L>   |  24  |  0.66    Ca    9.1      24 Oct 2017 10:50  Phos  2.8     10-24  Mg     2.2     10-24    RADIOLOGY & ADDITIONAL TESTS:    Imaging Personally Reviewed: CXR, MRI brain  Consultant(s) Notes Reviewed: NeuroSx, Trauma

## 2017-12-19 PROCEDURE — 82962 GLUCOSE BLOOD TEST: CPT

## 2017-12-19 PROCEDURE — 81001 URINALYSIS AUTO W/SCOPE: CPT

## 2017-12-19 PROCEDURE — 70450 CT HEAD/BRAIN W/O DYE: CPT

## 2017-12-19 PROCEDURE — 90471 IMMUNIZATION ADMIN: CPT

## 2017-12-19 PROCEDURE — 72170 X-RAY EXAM OF PELVIS: CPT

## 2017-12-19 PROCEDURE — 86901 BLOOD TYPING SEROLOGIC RH(D): CPT

## 2017-12-19 PROCEDURE — 85610 PROTHROMBIN TIME: CPT

## 2017-12-19 PROCEDURE — 82607 VITAMIN B-12: CPT

## 2017-12-19 PROCEDURE — 84100 ASSAY OF PHOSPHORUS: CPT

## 2017-12-19 PROCEDURE — 86900 BLOOD TYPING SEROLOGIC ABO: CPT

## 2017-12-19 PROCEDURE — 80048 BASIC METABOLIC PNL TOTAL CA: CPT

## 2017-12-19 PROCEDURE — 96374 THER/PROPH/DIAG INJ IV PUSH: CPT

## 2017-12-19 PROCEDURE — 85730 THROMBOPLASTIN TIME PARTIAL: CPT

## 2017-12-19 PROCEDURE — 97110 THERAPEUTIC EXERCISES: CPT

## 2017-12-19 PROCEDURE — 72125 CT NECK SPINE W/O DYE: CPT

## 2017-12-19 PROCEDURE — 83735 ASSAY OF MAGNESIUM: CPT

## 2017-12-19 PROCEDURE — 90715 TDAP VACCINE 7 YRS/> IM: CPT

## 2017-12-19 PROCEDURE — 73030 X-RAY EXAM OF SHOULDER: CPT

## 2017-12-19 PROCEDURE — 93971 EXTREMITY STUDY: CPT

## 2017-12-19 PROCEDURE — 97162 PT EVAL MOD COMPLEX 30 MIN: CPT

## 2017-12-19 PROCEDURE — 86850 RBC ANTIBODY SCREEN: CPT

## 2017-12-19 PROCEDURE — 80053 COMPREHEN METABOLIC PANEL: CPT

## 2017-12-19 PROCEDURE — 99285 EMERGENCY DEPT VISIT HI MDM: CPT | Mod: 25

## 2017-12-19 PROCEDURE — 85027 COMPLETE CBC AUTOMATED: CPT

## 2017-12-19 PROCEDURE — 73020 X-RAY EXAM OF SHOULDER: CPT

## 2017-12-19 PROCEDURE — 93005 ELECTROCARDIOGRAM TRACING: CPT

## 2017-12-19 PROCEDURE — 71045 X-RAY EXAM CHEST 1 VIEW: CPT

## 2017-12-19 PROCEDURE — 70486 CT MAXILLOFACIAL W/O DYE: CPT

## 2018-02-10 NOTE — PROGRESS NOTE ADULT - PROBLEM/PLAN-1
DISPLAY PLAN FREE TEXT
Yes

## 2018-09-21 NOTE — PROGRESS NOTE ADULT - PROBLEM/PLAN-4
DISPLAY PLAN FREE TEXT
PERIPHERAL EDEMA

## 2018-11-08 NOTE — PHYSICAL THERAPY INITIAL EVALUATION ADULT - REFERRING PHYSICIAN, REHAB EVAL
Per Dr Milagro Deng draw up 1 cc Marcaine 0.5% and 1 cc Kenalog 40 for injectio to left foot Wisam Blackwood. Consult- PT evaluate and treat,

## 2019-04-01 ENCOUNTER — INPATIENT (INPATIENT)
Facility: HOSPITAL | Age: 84
LOS: 9 days | Discharge: HOSPICE | DRG: 309 | End: 2019-04-11
Attending: STUDENT IN AN ORGANIZED HEALTH CARE EDUCATION/TRAINING PROGRAM | Admitting: INTERNAL MEDICINE
Payer: MEDICARE

## 2019-04-01 VITALS
DIASTOLIC BLOOD PRESSURE: 71 MMHG | WEIGHT: 119.93 LBS | OXYGEN SATURATION: 95 % | HEART RATE: 70 BPM | SYSTOLIC BLOOD PRESSURE: 108 MMHG | RESPIRATION RATE: 16 BRPM | TEMPERATURE: 99 F

## 2019-04-01 DIAGNOSIS — I48.91 UNSPECIFIED ATRIAL FIBRILLATION: ICD-10-CM

## 2019-04-01 LAB
ALBUMIN FLD-MCNC: 2.5 G/DL — SIGNIFICANT CHANGE UP
ALBUMIN SERPL ELPH-MCNC: 3 G/DL — LOW (ref 3.3–5)
ALP SERPL-CCNC: 106 U/L — SIGNIFICANT CHANGE UP (ref 40–120)
ALT FLD-CCNC: 33 U/L — SIGNIFICANT CHANGE UP (ref 10–45)
ANION GAP SERPL CALC-SCNC: 16 MMOL/L — SIGNIFICANT CHANGE UP (ref 5–17)
APPEARANCE UR: CLEAR — SIGNIFICANT CHANGE UP
APTT BLD: 29.9 SEC — SIGNIFICANT CHANGE UP (ref 27.5–36.3)
AST SERPL-CCNC: 34 U/L — SIGNIFICANT CHANGE UP (ref 10–40)
B PERT IGG+IGM PNL SER: ABNORMAL
BASOPHILS # BLD AUTO: 0 K/UL — SIGNIFICANT CHANGE UP (ref 0–0.2)
BASOPHILS NFR BLD AUTO: 0.2 % — SIGNIFICANT CHANGE UP (ref 0–2)
BILIRUB SERPL-MCNC: 0.4 MG/DL — SIGNIFICANT CHANGE UP (ref 0.2–1.2)
BILIRUB UR-MCNC: NEGATIVE — SIGNIFICANT CHANGE UP
BUN SERPL-MCNC: 38 MG/DL — HIGH (ref 7–23)
CALCIUM SERPL-MCNC: 8.7 MG/DL — SIGNIFICANT CHANGE UP (ref 8.4–10.5)
CHLORIDE SERPL-SCNC: 104 MMOL/L — SIGNIFICANT CHANGE UP (ref 96–108)
CO2 SERPL-SCNC: 20 MMOL/L — LOW (ref 22–31)
COLOR FLD: SIGNIFICANT CHANGE UP
COLOR SPEC: YELLOW — SIGNIFICANT CHANGE UP
CREAT SERPL-MCNC: 0.81 MG/DL — SIGNIFICANT CHANGE UP (ref 0.5–1.3)
DIFF PNL FLD: NEGATIVE — SIGNIFICANT CHANGE UP
EOSINOPHIL # BLD AUTO: 0.1 K/UL — SIGNIFICANT CHANGE UP (ref 0–0.5)
EOSINOPHIL # FLD: 1 % — SIGNIFICANT CHANGE UP
EOSINOPHIL NFR BLD AUTO: 1.3 % — SIGNIFICANT CHANGE UP (ref 0–6)
FLUID INTAKE SUBSTANCE CLASS: SIGNIFICANT CHANGE UP
FLUID SEGMENTED GRANULOCYTES: 5 % — SIGNIFICANT CHANGE UP
GAS PNL BLDV: SIGNIFICANT CHANGE UP
GLUCOSE FLD-MCNC: 113 MG/DL — SIGNIFICANT CHANGE UP
GLUCOSE SERPL-MCNC: 106 MG/DL — HIGH (ref 70–99)
GLUCOSE UR QL: NEGATIVE — SIGNIFICANT CHANGE UP
HCT VFR BLD CALC: 40.4 % — SIGNIFICANT CHANGE UP (ref 34.5–45)
HGB BLD-MCNC: 12.8 G/DL — SIGNIFICANT CHANGE UP (ref 11.5–15.5)
INR BLD: 1.27 RATIO — HIGH (ref 0.88–1.16)
KETONES UR-MCNC: NEGATIVE — SIGNIFICANT CHANGE UP
LDH SERPL L TO P-CCNC: 473 U/L — SIGNIFICANT CHANGE UP
LEUKOCYTE ESTERASE UR-ACNC: NEGATIVE — SIGNIFICANT CHANGE UP
LYMPHOCYTES # BLD AUTO: 1.3 K/UL — SIGNIFICANT CHANGE UP (ref 1–3.3)
LYMPHOCYTES # BLD AUTO: 13.9 % — SIGNIFICANT CHANGE UP (ref 13–44)
LYMPHOCYTES # FLD: 86 % — SIGNIFICANT CHANGE UP
MCHC RBC-ENTMCNC: 29.3 PG — SIGNIFICANT CHANGE UP (ref 27–34)
MCHC RBC-ENTMCNC: 31.6 GM/DL — LOW (ref 32–36)
MCV RBC AUTO: 92.6 FL — SIGNIFICANT CHANGE UP (ref 80–100)
MONOCYTES # BLD AUTO: 1.1 K/UL — HIGH (ref 0–0.9)
MONOCYTES NFR BLD AUTO: 12.2 % — SIGNIFICANT CHANGE UP (ref 2–14)
MONOS+MACROS # FLD: 7 % — SIGNIFICANT CHANGE UP
NEUTROPHILS # BLD AUTO: 6.6 K/UL — SIGNIFICANT CHANGE UP (ref 1.8–7.4)
NEUTROPHILS NFR BLD AUTO: 72.4 % — SIGNIFICANT CHANGE UP (ref 43–77)
NITRITE UR-MCNC: NEGATIVE — SIGNIFICANT CHANGE UP
OTHER CELLS FLD MANUAL: 1 % — SIGNIFICANT CHANGE UP
PH FLD: 7.62 — SIGNIFICANT CHANGE UP
PH UR: 6 — SIGNIFICANT CHANGE UP (ref 5–8)
PLATELET # BLD AUTO: 372 K/UL — SIGNIFICANT CHANGE UP (ref 150–400)
POTASSIUM SERPL-MCNC: 4.3 MMOL/L — SIGNIFICANT CHANGE UP (ref 3.5–5.3)
POTASSIUM SERPL-SCNC: 4.3 MMOL/L — SIGNIFICANT CHANGE UP (ref 3.5–5.3)
PROT FLD-MCNC: 4.8 G/DL — SIGNIFICANT CHANGE UP
PROT SERPL-MCNC: 7.2 G/DL — SIGNIFICANT CHANGE UP (ref 6–8.3)
PROT UR-MCNC: ABNORMAL
PROTHROM AB SERPL-ACNC: 14.6 SEC — HIGH (ref 10–12.9)
RAPID RVP RESULT: SIGNIFICANT CHANGE UP
RBC # BLD: 4.36 M/UL — SIGNIFICANT CHANGE UP (ref 3.8–5.2)
RBC # FLD: 13.3 % — SIGNIFICANT CHANGE UP (ref 10.3–14.5)
RCV VOL RI: HIGH /UL (ref 0–5)
SODIUM SERPL-SCNC: 140 MMOL/L — SIGNIFICANT CHANGE UP (ref 135–145)
SP GR SPEC: 1.03 — HIGH (ref 1.01–1.02)
TOTAL NUCLEATED CELL COUNT, BODY FLUID: 333 /UL — HIGH (ref 0–5)
TUBE TYPE: SIGNIFICANT CHANGE UP
UROBILINOGEN FLD QL: NEGATIVE — SIGNIFICANT CHANGE UP
WBC # BLD: 9.1 K/UL — SIGNIFICANT CHANGE UP (ref 3.8–10.5)
WBC # FLD AUTO: 9.1 K/UL — SIGNIFICANT CHANGE UP (ref 3.8–10.5)

## 2019-04-01 PROCEDURE — 70450 CT HEAD/BRAIN W/O DYE: CPT | Mod: 26

## 2019-04-01 PROCEDURE — 71250 CT THORAX DX C-: CPT | Mod: 26

## 2019-04-01 PROCEDURE — 93010 ELECTROCARDIOGRAM REPORT: CPT | Mod: 59,GC

## 2019-04-01 PROCEDURE — 99291 CRITICAL CARE FIRST HOUR: CPT | Mod: 25,GC

## 2019-04-01 PROCEDURE — 99233 SBSQ HOSP IP/OBS HIGH 50: CPT

## 2019-04-01 PROCEDURE — 88108 CYTOPATH CONCENTRATE TECH: CPT | Mod: 26

## 2019-04-01 PROCEDURE — 71045 X-RAY EXAM CHEST 1 VIEW: CPT | Mod: 26

## 2019-04-01 PROCEDURE — 32556 INSERT CATH PLEURA W/O IMAGE: CPT | Mod: GC

## 2019-04-01 RX ORDER — DIGOXIN 250 MCG
0.5 TABLET ORAL ONCE
Qty: 0 | Refills: 0 | Status: COMPLETED | OUTPATIENT
Start: 2019-04-01 | End: 2019-04-01

## 2019-04-01 RX ORDER — SODIUM CHLORIDE 9 MG/ML
2000 INJECTION INTRAMUSCULAR; INTRAVENOUS; SUBCUTANEOUS ONCE
Qty: 0 | Refills: 0 | Status: COMPLETED | OUTPATIENT
Start: 2019-04-01 | End: 2019-04-01

## 2019-04-01 RX ORDER — FENTANYL CITRATE 50 UG/ML
50 INJECTION INTRAVENOUS ONCE
Qty: 0 | Refills: 0 | Status: DISCONTINUED | OUTPATIENT
Start: 2019-04-01 | End: 2019-04-01

## 2019-04-01 RX ORDER — DIGOXIN 250 MCG
0.5 TABLET ORAL ONCE
Qty: 0 | Refills: 0 | Status: DISCONTINUED | OUTPATIENT
Start: 2019-04-01 | End: 2019-04-01

## 2019-04-01 RX ORDER — OXYCODONE HYDROCHLORIDE 5 MG/1
5 TABLET ORAL EVERY 6 HOURS
Qty: 0 | Refills: 0 | Status: DISCONTINUED | OUTPATIENT
Start: 2019-04-01 | End: 2019-04-05

## 2019-04-01 RX ORDER — ACETAMINOPHEN 500 MG
500 TABLET ORAL EVERY 6 HOURS
Qty: 0 | Refills: 0 | Status: DISCONTINUED | OUTPATIENT
Start: 2019-04-01 | End: 2019-04-06

## 2019-04-01 RX ORDER — DIGOXIN 250 MCG
0.12 TABLET ORAL DAILY
Qty: 0 | Refills: 0 | Status: DISCONTINUED | OUTPATIENT
Start: 2019-04-01 | End: 2019-04-06

## 2019-04-01 RX ORDER — ASPIRIN/CALCIUM CARB/MAGNESIUM 324 MG
81 TABLET ORAL DAILY
Qty: 0 | Refills: 0 | Status: DISCONTINUED | OUTPATIENT
Start: 2019-04-01 | End: 2019-04-06

## 2019-04-01 RX ORDER — DILTIAZEM HCL 120 MG
5 CAPSULE, EXT RELEASE 24 HR ORAL ONCE
Qty: 0 | Refills: 0 | Status: COMPLETED | OUTPATIENT
Start: 2019-04-01 | End: 2019-04-01

## 2019-04-01 RX ADMIN — Medication 5 MILLIGRAM(S): at 16:19

## 2019-04-01 RX ADMIN — SODIUM CHLORIDE 2000 MILLILITER(S): 9 INJECTION INTRAMUSCULAR; INTRAVENOUS; SUBCUTANEOUS at 15:26

## 2019-04-01 RX ADMIN — FENTANYL CITRATE 50 MICROGRAM(S): 50 INJECTION INTRAVENOUS at 20:21

## 2019-04-01 RX ADMIN — Medication 0.5 MILLIGRAM(S): at 20:20

## 2019-04-01 RX ADMIN — FENTANYL CITRATE 50 MICROGRAM(S): 50 INJECTION INTRAVENOUS at 18:01

## 2019-04-01 NOTE — ED PROVIDER NOTE - OBJECTIVE STATEMENT
89 yo female with dementia presenting with decreased activity and PO for 1 day. Lives in independent living home, was brought in for decreased communication, PO, and energy for 1 day. family feels that she is less interactive. Denies any pain. brought in with Afib with RVR and hypotensive.    Denies LOC, CP, SOB, N/V, new onset weakness, fevers.

## 2019-04-01 NOTE — CONSULT NOTE ADULT - ASSESSMENT
89/F with mild dementia on no medications with reportedly no other PMH found to have left sided pleural effusion and atrial fibrillation with RVR c/b hypotension that did not respond to cardizem 5 mg.     #Atrial fibrillation with RVR  -consider amiodarone 150 mg IV or if pressure tolerates beta blocker for atrial fibrillation with RVR  -obtain TTE when HR better controlled    #Left sided pleural effusion  -consider drainage by throacentesis    #Dispo  -update records to reflect DNR/DNI status    Pending review by attending. 89/F with mild dementia on no medications with reportedly no other PMH found to have left sided pleural effusion and atrial fibrillation with RVR c/b hypotension that did not respond to cardizem 5 mg.     #Atrial fibrillation with RVR  -consider amiodarone 150 mg IV or if pressure tolerates beta blocker for atrial fibrillation with RVR  -obtain TTE when HR better controlled    #Left sided pleural effusion with blood tinged effusion  -s/p drainage by throacentesis  -send for hematocrit  -consider CT surgery consult  -obtain CXR and f/u results  -obtain blood cultures    #Dispo  -update records to reflect DNR/DNI status  -not a candidate for medical ICU admission at this time    Michelle Mart, PGY 3  Internal Medicine  Pager [phone] | [phone]  [facility] [id number]

## 2019-04-01 NOTE — ED ADULT NURSE REASSESSMENT NOTE - NS ED NURSE REASSESS COMMENT FT1
5mg of Cardizem administered as per MD orders. MD [place] at bedside. pt remains on CM and BP being closely monitored. 3rd 500 ml bolus being infused.

## 2019-04-01 NOTE — H&P ADULT - HISTORY OF PRESENT ILLNESS
CHIEF COMPLAINT:Patient is a 89y old  Female who presents with a chief complaint of Left sided pleural effusion (01 Apr 2019 17:47)      HPI:  4 yo female with dementia presenting with decreased activity and PO for 1 day. Lives in independent living home, was brought in for decreased communication, PO, and energy for 1 day. family feels that she is less interactive. Denies any pain. brought in with Afib with RVR and hypotensive.  pt recived chest tube with sig egffusion and in rapid a.fib and hypotension.    PAST MEDICAL & SURGICAL HISTORY:  No pertinent past medical history  No significant past surgical history      MEDICATIONS  (STANDING):    MEDICATIONS  (PRN):      FAMILY HISTORY:  No pertinent family history in first degree relatives      SOCIAL HISTORY:    [ ] Non-smoker  [ ] Smoker  [ ] Alcohol    Allergies    penicillin G benzathine (Unknown)    Intolerances    	    REVIEW OF SYSTEMS:  CONSTITUTIONAL: No fever, weight loss, or fatigue  EYES: No eye pain, visual disturbances, or discharge  ENT:  No difficulty hearing, tinnitus, vertigo; No sinus or throat pain  NECK: No pain or stiffness  RESPIRATORY: No cough, wheezing, chills or hemoptysis;+ Shortness of Breath  CARDIOVASCULAR: No chest pain, palpitations, passing out, dizziness, or leg swelling  GASTROINTESTINAL: No abdominal or epigastric pain. No nausea, vomiting, or hematemesis; No diarrhea or constipation. No melena or hematochezia.  GENITOURINARY: No dysuria, frequency, hematuria, or incontinence  NEUROLOGICAL: No headaches, memory loss, loss of strength, numbness, or tremors  SKIN: No itching, burning, rashes, or lesions   LYMPH Nodes: No enlarged glands  ENDOCRINE: No heat or cold intolerance; No hair loss  MUSCULOSKELETAL: No joint pain or swelling; No muscle, back, or extremity pain  PSYCHIATRIC: No depression, anxiety, mood swings, or difficulty sleeping  HEME/LYMPH: No easy bruising, or bleeding gums  ALLERGY AND IMMUNOLOGIC: No hives or eczema	    [ ] All others negative	  [x ] Unable to obtain    PHYSICAL EXAM:  T(C): 36.9 (04-01-19 @ 14:45), Max: 37 (04-01-19 @ 14:25)  HR: 90 (04-01-19 @ 20:38) (70 - 163)  BP: 124/75 (04-01-19 @ 20:38) (70/57 - 124/75)  RR: 16 (04-01-19 @ 20:38) (16 - 22)  SpO2: 95% (04-01-19 @ 20:38) (93% - 100%)  Wt(kg): --  I&O's Summary      Appearance: Normal	  HEENT:   Normal oral mucosa, PERRL, EOMI	  Lymphatic: No lymphadenopathy  Cardiovascular: Normal S1 S2, No JVD, +murmurs, No edema  Respiratory: decrease bs r side s/p chest tube  Psychiatry: confuse,dementia  Gastrointestinal:  Soft, Non-tender, + BS	  Skin: No rashes, No ecchymoses, No cyanosis	  Neurologic: Non-focal  Extremities: Normal range of motion, No clubbing, cyanosis or edema  Vascular: Peripheral pulses palpable 2+ bilaterally    TELEMETRY: 	    ECG:  	  RADIOLOGY:  OTHER: 	  	  LABS:	 	    CARDIAC MARKERS:  CARDIAC MARKERS ( 01 Apr 2019 15:36 )  x     / x     / 51 U/L / x     / 3.9 ng/mL                              12.8   9.1   )-----------( 372      ( 01 Apr 2019 15:36 )             40.4     04-01    140  |  104  |  38<H>  ----------------------------<  106<H>  4.3   |  20<L>  |  0.81    Ca    8.7      01 Apr 2019 15:36    TPro  7.2  /  Alb  3.0<L>  /  TBili  0.4  /  DBili  x   /  AST  34  /  ALT  33  /  AlkPhos  106  04-01    proBNP:   Lipid Profile:   HgA1c:   TSH:   PT/INR - ( 01 Apr 2019 15:36 )   PT: 14.6 sec;   INR: 1.27 ratio         PTT - ( 01 Apr 2019 15:36 )  PTT:29.9 sec    PREVIOUS DIAGNOSTIC TESTING:    < from: CT Chest No Cont (04.01.19 @ 16:58) >  IMPRESSION: Large left pleural effusion with associated atelectasis of   the left upper and lower lobes.    Hyperdense material within the left pleural space suggesting hemothorax   or pleural thickening.

## 2019-04-01 NOTE — H&P ADULT - ASSESSMENT
pt with hx of dementia with decrease po intake with rapid a.fib and bloody fluid.  check fluid  no ac sec to bloody pleural fluid  asa daily  digoxin ?amio  echo  dvt prophylaxis  tsh  thoracic surgery eval  pt is DNR as per daughter

## 2019-04-01 NOTE — ED PROVIDER NOTE - PROGRESS NOTE DETAILS
/75 feels mildly improved will tx with  cardizem and hold dig for now  Wallace Espana MD, Facep BP drop to 70 systolic with 5mg cardizem, Fluid bolus infusing  Wallace Espana MD, Facep Discussed with Private Physician HELEN Omalley   Discussed with HELEN Crane consult Dr Ibeth Espana MD, Facep CXR left total white out ct chest ordered.  Wallace Espana MD, Facep John Velez PGY1  Chest tube placed, micu consulted, did not think patient was MICU candidate at this time. Admitted to Dr. Geiger, /72 Hr 94  Wallace Espana MD, Facep

## 2019-04-01 NOTE — ED PROVIDER NOTE - CARE PLAN
Principal Discharge DX:	Atrial fibrillation with RVR  Secondary Diagnosis:	Hypotension  Secondary Diagnosis:	Pleural effusion

## 2019-04-01 NOTE — ED ADULT NURSE NOTE - NSIMPLEMENTINTERV_GEN_ALL_ED
Implemented All Universal Safety Interventions:  [place] to call system. Call bell, personal items and telephone within reach. Instruct patient to call for assistance. Room bathroom lighting operational. Non-slip footwear when patient is off stretcher. Physically safe environment: no spills, clutter or unnecessary equipment. Stretcher in lowest position, wheels locked, appropriate side rails in place.

## 2019-04-01 NOTE — ED ADULT NURSE NOTE - OBJECTIVE STATEMENT
89 yo presents to the ED by EMS from [facility]. pt presents A&Ox1. as per family at bedside pt has PMH of dementia but can communicate with caregiver. as per family, at baseline pt is ambulatory and performs all ADL's independently. daughter went to visit pt on 3/29 and noticed that pt was altered, not eating well much and very tired. daughter reports today pt was altered upon daughters visit which encouraged the ED visit. pt denies any pain but daughter reports pt never states that she is in pain. pt arrives with HR in the 150's, MD Espana at bedside and is aware. pt is hypotensive, 85/54, MD Espana aware. pt arrives to ED with 20G in RAC and with NS 400ml infused by EMS. site is swollen and cold. IV removed and additional IVs placed in L forearm and R forearm. rectal temp 98.4. daughter at bedside.

## 2019-04-01 NOTE — ED PROVIDER NOTE - ATTENDING CONTRIBUTION TO CARE
89y female with pmh dementia, last admitted approx 2y ago for fall with SAH and fx shoulder. Now comes to ed complains of increased weakness fatigued and ams with decreased po and without fever. 89y female with pmh dementia, last admitted approx 2y ago for fall with SAH and fx shoulder. Now comes to ed complains of increased weakness fatigued and ams with decreased po and without fever. Found in rapid afib ems to ed. Pt very poor historian, Oriented x1, able to id daughter. NCAT neck supple chest clear anterior & posterior abd soft +bs no mass guarding. No rebound, Neuro moves all extr  Wallace Espana MD, Facep  Imp Elderly female with new onset afib w rvr and hypotesion. IVF Bolus and control rate, ro acs/mi,check ,labs vbg. 89y female with pmh dementia, last admitted approx 2y ago for fall with SAH and fx shoulder. Now comes to ed complains of increased weakness fatigued and ams with decreased po and without fever. Found in rapid afib ems to ed. Pt very poor historian, Oriented x1, able to id daughter. NCAT neck supple, abd soft +bs no mass guarding. No rebound, Neuro moves all extr cv irregular irregular chest decreased bs left chest.  Wallace Espana MD, Facep  Imp Elderly female with new onset afib w rvr and hypotesion. IVF Bolus and control rate, ro acs/mi,check ,labs vbg.

## 2019-04-01 NOTE — ED PROVIDER NOTE - CLINICAL SUMMARY MEDICAL DECISION MAKING FREE TEXT BOX
Eldlery female with new rapid afib and hypotension with large pleural effusion. Thoracentesis drain placed, declined from unit, Bp mildly improved with 98/65. will treat with Dig, admit to tele  Wallace Espana MD, Facep

## 2019-04-01 NOTE — CONSULT NOTE ADULT - SUBJECTIVE AND OBJECTIVE BOX
CHIEF COMPLAINT: lethargy and weakness    HPI: 89F with dementia however lives independently at [facility] was brought in after visit with [facility] medical team. She is accompanied by her two daughters who state that since Friday patient has been more lethargic and had a decreased appetite. She ate half the amount of food she was given by the cafeteria. Patient was only interested in sleep. Although she has dementia she reportedly refused to take any medications and did not even take an aspirin and took no herbal or over the counter medications.     PAST MEDICAL & SURGICAL HISTORY:  No pertinent past medical history  No significant past surgical history      FAMILY HISTORY:  No pertinent family history in first degree relatives      SOCIAL HISTORY:  Smoking: [x] Never Smoked [ ] Former Smoker (__ packs x ___ years) [ ] Current Smoker  (__ packs x ___ years)  Substance Use: [x] Never Used [ ] Used ____  EtOH Use:  Marital Status: [ ] Single [ ]  [ ]  [ ]   Sexual History:   Occupation:  Recent Travel:  Country of Birth:  Advance Directives: DNR/DNI per [facility] at [facility]     Allergies    penicillin G benzathine (Unknown)    Intolerances        HOME MEDICATIONS: none    REVIEW OF SYSTEMS:  Constitutional: [ ] fevers [ ] chills [ ] weight loss [ ] weight gain  HEENT: [ ] dry eyes [ ] eye irritation [ ] postnasal drip [ ] nasal congestion  CV: [ ] chest pain [ ] orthopnea [ ] palpitations [ ] murmur  Resp: [ ] cough [ ] shortness of breath [ ] dyspnea [ ] wheezing [ ] sputum [ ] hemoptysis  GI: [ ] nausea [ ] vomiting [ ] diarrhea [ ] constipation [ ] abd pain [ ] dysphagia   : [ ] dysuria [ ] nocturia [ ] hematuria [ ] increased urinary frequency  Musculoskeletal: [ ] back pain [ ] myalgias [ ] arthralgias [ ] fracture  Skin: [ ] rash [ ] itch  Neurological: [ ] headache [ ] dizziness [ ] syncope [ ] weakness [ ] numbness  Psychiatric: [ ] anxiety [ ] depression  Endocrine: [ ] diabetes [ ] thyroid problem  Hematologic/Lymphatic: [ ] anemia [ ] bleeding problem  Allergic/Immunologic: [ ] itchy eyes [ ] nasal discharge [ ] hives [ ] angioedema  [ ] All other systems negative  [x] Unable to assess ROS because patient only nodding to questions and shakes her head no when asked to speak up.     OBJECTIVE:  ICU Vital Signs Last 24 Hrs  T(C): 36.9 (2019 14:45), Max: 37 (2019 14:25)  T(F): 98.5 (2019 14:45), Max: 98.6 (2019 14:25)  HR: 153 (2019 17:12) (70 - 162)  BP: 83/63 (2019 17:12) (70/57 - 108/71)  BP(mean): 71 (2019 17:12) (61 - 71)  ABP: --  ABP(mean): --  RR: 18 (2019 17:12) (16 - 18)  SpO2: 100% (2019 17:12) (95% - 100%)        CAPILLARY BLOOD GLUCOSE      POCT Blood Glucose.: 96 mg/dL (2019 14:43)      PHYSICAL EXAM:  General: no acute distress, nods and smiles when asks how she's feeling  HEENT: Pupils equal reactive to light  Neck: supple  Respiratory: decreased breath sounds on left side, right sided breath sounds clear to auscultation anteriorly. Patient refused exam of posterior thorax as she was cold.  Cardiovascular: S1, S2, tachycardia  Abdomen: soft, NT, ND  Extremities: +LE edema bilaterally  Skin: warm, dry  Neurological: unable to assess orientation as patient intermittently answers questions  Psychiatry: unable to assess    LINES:     HOSPITAL MEDICATIONS:  MEDICATIONS  (STANDING):  digoxin  Injectable 0.5 milliGRAM(s) IV Push Once  fentaNYL    Injectable 50 MICROGram(s) IV Push Once    MEDICATIONS  (PRN):      LABS:                        12.8   9.1   )-----------( 372      ( 2019 15:36 )             40.4     Hgb Trend: 12.8<--  04-01    140  |  104  |  38<H>  ----------------------------<  106<H>  4.3   |  20<L>  |  0.81    Ca    8.7      2019 15:36    TPro  7.2  /  Alb  3.0<L>  /  TBili  0.4  /  DBili  x   /  AST  34  /  ALT  33  /  AlkPhos  106      Creatinine Trend: 0.81<--  PT/INR - ( 2019 15:36 )   PT: 14.6 sec;   INR: 1.27 ratio         PTT - ( 2019 15:36 )  PTT:29.9 sec  Urinalysis Basic - ( 2019 15:36 )    Color: Yellow / Appearance: Clear / S.028 / pH: x  Gluc: x / Ketone: Negative  / Bili: Negative / Urobili: Negative   Blood: x / Protein: Trace / Nitrite: Negative   Leuk Esterase: Negative / RBC: 2 /hpf / WBC 1 /HPF   Sq Epi: x / Non Sq Epi: 1 /hpf / Bacteria: Negative        Venous Blood Gas:   @ 15:36  7.39/38/24/22/36  VBG Lactate: 3.6      MICROBIOLOGY:     RADIOLOGY:  [x] Reviewed and interpreted by me  CXR: left sided pleural effusion    EKG: CHIEF COMPLAINT: lethargy and weakness    HPI: 89F with dementia however lives independently at [facility] was brought in after visit with [facility] medical team. She is accompanied by her two daughters who state that since Friday patient has been more lethargic and had a decreased appetite. She ate half the amount of food she was given by the cafeteria. Patient was only interested in sleep. Although she has dementia she reportedly refused to take any medications and did not even take an aspirin and took no herbal or over the counter medications.     PAST MEDICAL & SURGICAL HISTORY:  No pertinent past medical history  No significant past surgical history      FAMILY HISTORY:  No pertinent family history in first degree relatives      SOCIAL HISTORY:  Smoking: [x] Never Smoked [ ] Former Smoker (__ packs x ___ years) [ ] Current Smoker  (__ packs x ___ years)  Substance Use: [x] Never Used [ ] Used ____  EtOH Use:  Marital Status: [ ] Single [ ]  [ ]  [ ]   Sexual History:   Occupation:  Recent Travel:  Country of Birth:  Advance Directives: DNR/DNI per [facility] filled out at [facility]     Allergies    penicillin G benzathine (Unknown)    Intolerances        HOME MEDICATIONS: none    REVIEW OF SYSTEMS:  Constitutional: [ ] fevers [ ] chills [ ] weight loss [ ] weight gain  HEENT: [ ] dry eyes [ ] eye irritation [ ] postnasal drip [ ] nasal congestion  CV: [ ] chest pain [ ] orthopnea [ ] palpitations [ ] murmur  Resp: [ ] cough [ ] shortness of breath [ ] dyspnea [ ] wheezing [ ] sputum [ ] hemoptysis  GI: [ ] nausea [ ] vomiting [ ] diarrhea [ ] constipation [ ] abd pain [ ] dysphagia   : [ ] dysuria [ ] nocturia [ ] hematuria [ ] increased urinary frequency  Musculoskeletal: [ ] back pain [ ] myalgias [ ] arthralgias [ ] fracture  Skin: [ ] rash [ ] itch  Neurological: [ ] headache [ ] dizziness [ ] syncope [ ] weakness [ ] numbness  Psychiatric: [ ] anxiety [ ] depression  Endocrine: [ ] diabetes [ ] thyroid problem  Hematologic/Lymphatic: [ ] anemia [ ] bleeding problem  Allergic/Immunologic: [ ] itchy eyes [ ] nasal discharge [ ] hives [ ] angioedema  [ ] All other systems negative  [x] Unable to assess ROS because patient only nodding to questions and shakes her head no when asked to speak up.     OBJECTIVE:  ICU Vital Signs Last 24 Hrs  T(C): 36.9 (2019 14:45), Max: 37 (2019 14:25)  T(F): 98.5 (2019 14:45), Max: 98.6 (2019 14:25)  HR: 153 (2019 17:12) (70 - 162)  BP: 83/63 (2019 17:12) (70/57 - 108/71)  BP(mean): 71 (2019 17:12) (61 - 71)  ABP: --  ABP(mean): --  RR: 18 (2019 17:12) (16 - 18)  SpO2: 100% (2019 17:12) (95% - 100%)        CAPILLARY BLOOD GLUCOSE      POCT Blood Glucose.: 96 mg/dL (2019 14:43)      PHYSICAL EXAM:  General: no acute distress, nods and smiles when asks how she's feeling  HEENT: Pupils equal reactive to light  Neck: supple  Respiratory: decreased breath sounds on left side, right sided breath sounds clear to auscultation anteriorly. Patient refused exam of posterior thorax as she was cold.  Cardiovascular: S1, S2, tachycardia  Abdomen: soft, NT, ND  Extremities: +LE edema bilaterally  Skin: warm, dry  Neurological: unable to assess orientation as patient intermittently answers questions  Psychiatry: unable to assess    LINES:     HOSPITAL MEDICATIONS:  MEDICATIONS  (STANDING):  digoxin  Injectable 0.5 milliGRAM(s) IV Push Once  fentaNYL    Injectable 50 MICROGram(s) IV Push Once    MEDICATIONS  (PRN):      LABS:                        12.8   9.1   )-----------( 372      ( 2019 15:36 )             40.4     Hgb Trend: 12.8<--  04-01    140  |  104  |  38<H>  ----------------------------<  106<H>  4.3   |  20<L>  |  0.81    Ca    8.7      2019 15:36    TPro  7.2  /  Alb  3.0<L>  /  TBili  0.4  /  DBili  x   /  AST  34  /  ALT  33  /  AlkPhos  106      Creatinine Trend: 0.81<--  PT/INR - ( 2019 15:36 )   PT: 14.6 sec;   INR: 1.27 ratio         PTT - ( 2019 15:36 )  PTT:29.9 sec    Troponin T, High Sensitivity (19 @ 15:36)    Troponin T, High Sensitivity Result: 34: Rapid upward or downward changes in high-sensitivity troponin levels  suggest acute myocardial injury. Renal impairment may cause sustained  troponin elevations.  Normal: <6 - 14 ng/L  Indeterminate: 15-51 ng/L  Elevated: > 51 ng/L  See [email or web address]/test/[email or web address] on the [facility] intranet for more  information ng/L        Urinalysis Basic - ( 2019 15:36 )    Color: Yellow / Appearance: Clear / S.028 / pH: x  Gluc: x / Ketone: Negative  / Bili: Negative / Urobili: Negative   Blood: x / Protein: Trace / Nitrite: Negative   Leuk Esterase: Negative / RBC: 2 /hpf / WBC 1 /HPF   Sq Epi: x / Non Sq Epi: 1 /hpf / Bacteria: Negative        Venous Blood Gas:   @ 15:36  7.39/38/24/36  VBG Lactate: 3.6      MICROBIOLOGY:     RADIOLOGY:  [x] Reviewed and interpreted by me  CXR: left sided pleural effusion    EKG: CHIEF COMPLAINT: lethargy and weakness    HPI: 89F with dementia however lives independently at [facility] was brought in after visit with [facility] medical team. She is accompanied by her two daughters who state that since Friday patient has been more lethargic and had a decreased appetite. She ate half the amount of food she was given by the cafeteria. Patient was only interested in sleep. Although she has dementia she reportedly refused to take any medications and did not even take an aspirin and took no herbal or over the counter medications.     PAST MEDICAL & SURGICAL HISTORY:  No pertinent past medical history  No significant past surgical history      FAMILY HISTORY:  No pertinent family history in first degree relatives      SOCIAL HISTORY:  Smoking: [ ] Never Smoked [x] Former Smoker (__ packs x ___ years) [ ] Current Smoker  (__ packs x ___ years)  Substance Use: [x] Never Used [ ] Used ____  EtOH Use:  Marital Status: [ ] Single [ ]  [ ]  [ ]   Sexual History:   Occupation:  Recent Travel:  Country of Birth:  Advance Directives: DNR/DNI per [facility] reportedly filled out at [facility]     Allergies    penicillin G benzathine (Unknown)    Intolerances        HOME MEDICATIONS: none    REVIEW OF SYSTEMS:  Constitutional: [ ] fevers [ ] chills [ ] weight loss [ ] weight gain  HEENT: [ ] dry eyes [ ] eye irritation [ ] postnasal drip [ ] nasal congestion  CV: [ ] chest pain [ ] orthopnea [ ] palpitations [ ] murmur  Resp: [ ] cough [ ] shortness of breath [ ] dyspnea [ ] wheezing [ ] sputum [ ] hemoptysis  GI: [ ] nausea [ ] vomiting [ ] diarrhea [ ] constipation [ ] abd pain [ ] dysphagia   : [ ] dysuria [ ] nocturia [ ] hematuria [ ] increased urinary frequency  Musculoskeletal: [ ] back pain [ ] myalgias [ ] arthralgias [ ] fracture  Skin: [ ] rash [ ] itch  Neurological: [ ] headache [ ] dizziness [ ] syncope [ ] weakness [ ] numbness  Psychiatric: [ ] anxiety [ ] depression  Endocrine: [ ] diabetes [ ] thyroid problem  Hematologic/Lymphatic: [ ] anemia [ ] bleeding problem  Allergic/Immunologic: [ ] itchy eyes [ ] nasal discharge [ ] hives [ ] angioedema  [ ] All other systems negative  [x] Unable to assess ROS because patient only nodding to questions and shakes her head no when asked to speak up.     OBJECTIVE:  ICU Vital Signs Last 24 Hrs  T(C): 36.9 (2019 14:45), Max: 37 (2019 14:25)  T(F): 98.5 (2019 14:45), Max: 98.6 (2019 14:25)  HR: 153 (2019 17:12) (70 - 162)  BP: 83/63 (2019 17:12) (70/57 - 108/71)  BP(mean): 71 (2019 17:12) (61 - 71)  ABP: --  ABP(mean): --  RR: 18 (2019 17:12) (16 - 18)  SpO2: 100% (2019 17:12) (95% - 100%)        CAPILLARY BLOOD GLUCOSE      POCT Blood Glucose.: 96 mg/dL (2019 14:43)      PHYSICAL EXAM:  General: no acute distress, nods and smiles when asks how she's feeling  HEENT: Pupils equal reactive to light  Neck: supple  Respiratory: decreased breath sounds on left side, right sided breath sounds clear to auscultation anteriorly. Patient refused exam of posterior thorax as she was cold.  Cardiovascular: S1, S2, tachycardia  Abdomen: soft, NT, ND  Extremities: +LE edema bilaterally  Skin: warm, dry  Neurological: unable to assess orientation as patient intermittently answers questions  Psychiatry: unable to assess    LINES:     HOSPITAL MEDICATIONS:  MEDICATIONS  (STANDING):  digoxin  Injectable 0.5 milliGRAM(s) IV Push Once  fentaNYL    Injectable 50 MICROGram(s) IV Push Once    MEDICATIONS  (PRN):      LABS:                        12.8   9.1   )-----------( 372      ( 2019 15:36 )             40.4     Hgb Trend: 12.8<--  04-01    140  |  104  |  38<H>  ----------------------------<  106<H>  4.3   |  20<L>  |  0.81    Ca    8.7      2019 15:36    TPro  7.2  /  Alb  3.0<L>  /  TBili  0.4  /  DBili  x   /  AST  34  /  ALT  33  /  AlkPhos  106      Creatinine Trend: 0.81<--  PT/INR - ( 2019 15:36 )   PT: 14.6 sec;   INR: 1.27 ratio         PTT - ( 2019 15:36 )  PTT:29.9 sec    Troponin T, High Sensitivity (19 @ 15:36)    Troponin T, High Sensitivity Result: 34: Rapid upward or downward changes in high-sensitivity troponin levels  suggest acute myocardial injury. Renal impairment may cause sustained  troponin elevations.  Normal: <6 - 14 ng/L  Indeterminate: 15-51 ng/L  Elevated: > 51 ng/L  See [email or web address]/test/[email or web address] on the [facility] intranet for more  information ng/L        Urinalysis Basic - ( 2019 15:36 )    Color: Yellow / Appearance: Clear / S.028 / pH: x  Gluc: x / Ketone: Negative  / Bili: Negative / Urobili: Negative   Blood: x / Protein: Trace / Nitrite: Negative   Leuk Esterase: Negative / RBC: 2 /hpf / WBC 1 /HPF   Sq Epi: x / Non Sq Epi: 1 /hpf / Bacteria: Negative        Venous Blood Gas:   @ 15:36  7.39/38/24/36  VBG Lactate: 3.6      MICROBIOLOGY:     RADIOLOGY:  [x] Reviewed and interpreted by me  CXR: left sided pleural effusion    EKG:

## 2019-04-01 NOTE — ED ADULT NURSE NOTE - ED STAT RN HANDOFF DETAILS
report given to Holding Nurse Mary Ann BOND. pt VSS. On room air with no signs of distress. Admitted Tele. pt is A&Ox1. baseline for pt. chest tube in place, draining to gravity 1400TV. on cardiac monitor. awaiting bed placement.

## 2019-04-02 LAB
ALBUMIN SERPL ELPH-MCNC: 2.4 G/DL — LOW (ref 3.3–5)
ALP SERPL-CCNC: 102 U/L — SIGNIFICANT CHANGE UP (ref 40–120)
ALT FLD-CCNC: 44 U/L — SIGNIFICANT CHANGE UP (ref 10–45)
ANION GAP SERPL CALC-SCNC: 16 MMOL/L — SIGNIFICANT CHANGE UP (ref 5–17)
AST SERPL-CCNC: 45 U/L — HIGH (ref 10–40)
BILIRUB SERPL-MCNC: 0.4 MG/DL — SIGNIFICANT CHANGE UP (ref 0.2–1.2)
BLD GP AB SCN SERPL QL: NEGATIVE — SIGNIFICANT CHANGE UP
BUN SERPL-MCNC: 35 MG/DL — HIGH (ref 7–23)
CALCIUM SERPL-MCNC: 8.3 MG/DL — LOW (ref 8.4–10.5)
CHLORIDE SERPL-SCNC: 107 MMOL/L — SIGNIFICANT CHANGE UP (ref 96–108)
CO2 SERPL-SCNC: 18 MMOL/L — LOW (ref 22–31)
CREAT SERPL-MCNC: 0.81 MG/DL — SIGNIFICANT CHANGE UP (ref 0.5–1.3)
CULTURE RESULTS: NO GROWTH — SIGNIFICANT CHANGE UP
GLUCOSE SERPL-MCNC: 120 MG/DL — HIGH (ref 70–99)
GRAM STN FLD: SIGNIFICANT CHANGE UP
HCT VFR BLD CALC: 43.6 % — SIGNIFICANT CHANGE UP (ref 34.5–45)
HGB BLD-MCNC: 13.8 G/DL — SIGNIFICANT CHANGE UP (ref 11.5–15.5)
LACTATE SERPL-SCNC: 2.5 MMOL/L — HIGH (ref 0.7–2)
MCHC RBC-ENTMCNC: 29.8 PG — SIGNIFICANT CHANGE UP (ref 27–34)
MCHC RBC-ENTMCNC: 31.7 GM/DL — LOW (ref 32–36)
MCV RBC AUTO: 93.9 FL — SIGNIFICANT CHANGE UP (ref 80–100)
NIGHT BLUE STAIN TISS: SIGNIFICANT CHANGE UP
PLATELET # BLD AUTO: 299 K/UL — SIGNIFICANT CHANGE UP (ref 150–400)
POTASSIUM SERPL-MCNC: 4.5 MMOL/L — SIGNIFICANT CHANGE UP (ref 3.5–5.3)
POTASSIUM SERPL-SCNC: 4.5 MMOL/L — SIGNIFICANT CHANGE UP (ref 3.5–5.3)
PROT SERPL-MCNC: 6.2 G/DL — SIGNIFICANT CHANGE UP (ref 6–8.3)
RBC # BLD: 4.64 M/UL — SIGNIFICANT CHANGE UP (ref 3.8–5.2)
RBC # FLD: 13.6 % — SIGNIFICANT CHANGE UP (ref 10.3–14.5)
RH IG SCN BLD-IMP: POSITIVE — SIGNIFICANT CHANGE UP
SODIUM SERPL-SCNC: 141 MMOL/L — SIGNIFICANT CHANGE UP (ref 135–145)
SPECIMEN SOURCE: SIGNIFICANT CHANGE UP
TSH SERPL-MCNC: 2.03 UIU/ML — SIGNIFICANT CHANGE UP (ref 0.27–4.2)
WBC # BLD: 9.4 K/UL — SIGNIFICANT CHANGE UP (ref 3.8–10.5)
WBC # FLD AUTO: 9.4 K/UL — SIGNIFICANT CHANGE UP (ref 3.8–10.5)

## 2019-04-02 PROCEDURE — 71045 X-RAY EXAM CHEST 1 VIEW: CPT | Mod: 26

## 2019-04-02 PROCEDURE — 99223 1ST HOSP IP/OBS HIGH 75: CPT

## 2019-04-02 RX ORDER — DILTIAZEM HCL 120 MG
5 CAPSULE, EXT RELEASE 24 HR ORAL ONCE
Qty: 0 | Refills: 0 | Status: COMPLETED | OUTPATIENT
Start: 2019-04-02 | End: 2019-04-02

## 2019-04-02 RX ORDER — ACETAMINOPHEN 500 MG
1000 TABLET ORAL ONCE
Qty: 0 | Refills: 0 | Status: DISCONTINUED | OUTPATIENT
Start: 2019-04-02 | End: 2019-04-02

## 2019-04-02 RX ORDER — DIGOXIN 250 MCG
0.25 TABLET ORAL ONCE
Qty: 0 | Refills: 0 | Status: COMPLETED | OUTPATIENT
Start: 2019-04-02 | End: 2019-04-02

## 2019-04-02 RX ADMIN — Medication 0.25 MILLIGRAM(S): at 02:44

## 2019-04-02 RX ADMIN — Medication 5 MILLIGRAM(S): at 00:48

## 2019-04-02 RX ADMIN — Medication 0.12 MILLIGRAM(S): at 12:31

## 2019-04-02 RX ADMIN — Medication 81 MILLIGRAM(S): at 12:31

## 2019-04-02 RX ADMIN — Medication 1 TABLET(S): at 12:31

## 2019-04-02 NOTE — PROGRESS NOTE ADULT - SUBJECTIVE AND OBJECTIVE BOX
CARDIOLOGY     PROGRESS  NOTE   ________________________________________________    CHIEF COMPLAINT:Patient is a 89y old  Female who presents with a chief complaint of rapid a,fib (02 Apr 2019 01:14)  doing better.  	  REVIEW OF SYSTEMS:  CONSTITUTIONAL: No fever, weight loss, or fatigue  EYES: No eye pain, visual disturbances, or discharge  ENT:  No difficulty hearing, tinnitus, vertigo; No sinus or throat pain  NECK: No pain or stiffness  RESPIRATORY: No cough, wheezing, chills or hemoptysis;decrease  Shortness of Breath  CARDIOVASCULAR: No chest pain, palpitations, passing out, dizziness, or leg swelling  GASTROINTESTINAL: No abdominal or epigastric pain. No nausea, vomiting, or hematemesis; No diarrhea or constipation. No melena or hematochezia.  GENITOURINARY: No dysuria, frequency, hematuria, or incontinence  NEUROLOGICAL: No headaches, memory loss, loss of strength, numbness, or tremors  SKIN: No itching, burning, rashes, or lesions   LYMPH Nodes: No enlarged glands  ENDOCRINE: No heat or cold intolerance; No hair loss  MUSCULOSKELETAL: No joint pain or swelling; No muscle, back, or extremity pain  PSYCHIATRIC: No depression, anxiety, mood swings, or difficulty sleeping  HEME/LYMPH: No easy bruising, or bleeding gums  ALLERGY AND IMMUNOLOGIC: No hives or eczema	    [ ] All others negative	  [ ] Unable to obtain    PHYSICAL EXAM:  T(C): 36.9 (04-02-19 @ 07:43), Max: 37 (04-01-19 @ 14:25)  HR: 113 (04-02-19 @ 07:43) (70 - 163)  BP: 91/69 (04-02-19 @ 07:43) (70/57 - 124/75)  RR: 20 (04-02-19 @ 07:43) (16 - 23)  SpO2: 94% (04-02-19 @ 07:43) (93% - 100%)  Wt(kg): --  I&O's Summary    01 Apr 2019 07:01  -  02 Apr 2019 07:00  --------------------------------------------------------  IN: 0 mL / OUT: 1150 mL / NET: -1150 mL        Appearance: Normal	  HEENT:   Normal oral mucosa, PERRL, EOMI	  Lymphatic: No lymphadenopathy  Cardiovascular: Normal S1 S2, No JVD,+ murmurs, No edema  Respiratory: Lungs clear to auscultation	  Psychiatry: A & O x 3, Mood & affect appropriate  Gastrointestinal:  Soft, Non-tender, + BS	  Skin: No rashes, No ecchymoses, No cyanosis	  Neurologic: Non-focal  Extremities: Normal range of motion, No clubbing, cyanosis or edema  Vascular: Peripheral pulses palpable 2+ bilaterally    MEDICATIONS  (STANDING):  aspirin enteric coated 81 milliGRAM(s) Oral daily  digoxin  Injectable 0.125 milliGRAM(s) IV Push daily  multivitamin 1 Tablet(s) Oral daily      TELEMETRY: 	    ECG:  	  RADIOLOGY:  OTHER: 	  	  LABS:	 	    CARDIAC MARKERS:  CARDIAC MARKERS ( 01 Apr 2019 15:36 )  x     / x     / 51 U/L / x     / 3.9 ng/mL                                13.8   9.4   )-----------( 299      ( 02 Apr 2019 03:04 )             43.6     04-02    141  |  107  |  35<H>  ----------------------------<  120<H>  4.5   |  18<L>  |  0.81    Ca    8.3<L>      02 Apr 2019 03:04    TPro  6.2  /  Alb  2.4<L>  /  TBili  0.4  /  DBili  x   /  AST  45<H>  /  ALT  44  /  AlkPhos  102  04-02    proBNP:   Lipid Profile:   HgA1c:   TSH: Thyroid Stimulating Hormone, Serum: 2.03 uIU/mL (04-02 @ 08:59)    PT/INR - ( 01 Apr 2019 15:36 )   PT: 14.6 sec;   INR: 1.27 ratio         PTT - ( 01 Apr 2019 15:36 )  PTT:29.9 sec      Assessment and plan  ---------------------------  pleural effusion  chest xray, f/u thoracic  a.fib hr is better  continue dig for now  echo  tsh  nutrition  no ac sec to bloody effusion

## 2019-04-02 NOTE — CONSULT NOTE ADULT - ASSESSMENT
Gris Dixon is a 89 y.o. woman who presented to the ED on 4/1 for generalized malaise. Per the patient's family, the patient was less interactive, which prompted them to bring the patient into the ED. Found to have a large left pleural effusion; s/p tube thoracostomy with 1400+ of blood-tinged fluid.    Plan:  - Daily CXR  - Monitor output  - Keep tube to suction  - Patient is DNR/DNI  - No thoracic surgery intervention indicated  - Plan discussed with Dr. Jenae Hay, PGY2  [id number]

## 2019-04-02 NOTE — CONSULT NOTE ADULT - ATTENDING COMMENTS
Patient seen and examined agree with above note as modified, where appropriate, by me.
Patient is seen and examined.  89 yo woman with dementia admitted with new onset Afib with RVR and left sided pleural effusion. Her hypotension is likely rate dependent - would rate control and monitor HR and BPs. Check TSH, pan-cultures. S/p L sided 8Fr Chest tube placement - obtain CXR, check fluid studies including cytology and pleural fluid hematocrit (appears bloody but not hemothorax).  Continued GOC discussions with family.   Not MICU candidate at this time. Please re-consult as needed.

## 2019-04-02 NOTE — CONSULT NOTE ADULT - SUBJECTIVE AND OBJECTIVE BOX
General Surgery Consult      Consulting surgical team: Thoracic  Consulting attending: Dr. Emanuel       HPI: Gris Dixon is a 89 y.o. woman who presented to the ED on  for generalized malaise. Per the patient's family, the patient was less interactive, which prompted them to bring the patient into the ED.    At the time of presentation, the patient was found to be in Afib w/ RVR and hypotensive. Bedside US also identified a left-sided pleural effusion, now s/p thoracostomy tube placement.       PAST MEDICAL & SURGICAL HISTORY:  No pertinent past medical history  No significant past surgical history      FAMILY HISTORY:  No pertinent family history in first degree relatives      SOCIAL HISTORY:    [ ] Non-smoker  [ ] Smoker  [ ] Alcohol    Allergies    penicillin G benzathine (Unknown)    Intolerances    	    REVIEW OF SYSTEMS:  CONSTITUTIONAL: No fever, weight loss, or fatigue  EYES: No eye pain, visual disturbances, or discharge  ENT:  No difficulty hearing, tinnitus, vertigo; No sinus or throat pain  NECK: No pain or stiffness  RESPIRATORY: No cough, wheezing, chills or hemoptysis;+ Shortness of Breath  CARDIOVASCULAR: No chest pain, palpitations, passing out, dizziness, or leg swelling  GASTROINTESTINAL: No abdominal or epigastric pain. No nausea, vomiting, or hematemesis; No diarrhea or constipation. No melena or hematochezia.  GENITOURINARY: No dysuria, frequency, hematuria, or incontinence  NEUROLOGICAL: No headaches, memory loss, loss of strength, numbness, or tremors  SKIN: No itching, burning, rashes, or lesions   LYMPH Nodes: No enlarged glands  ENDOCRINE: No heat or cold intolerance; No hair loss  MUSCULOSKELETAL: No joint pain or swelling; No muscle, back, or extremity pain  PSYCHIATRIC: No depression, anxiety, mood swings, or difficulty sleeping  HEME/LYMPH: No easy bruising, or bleeding gums  ALLERGY AND IMMUNOLOGIC: No hives or eczema	    [ ] All others negative	  [x ] Unable to obtain    PHYSICAL EXAM:  T(C): 36.9 (19 @ 14:45), Max: 37 (19 @ 14:25)  HR: 90 (19 @ 20:38) (70 - 163)  BP: 124/75 (19 @ 20:38) (70/57 - 124/75)  RR: 16 (19 @ 20:38) (16 - 22)  SpO2: 95% (19 @ 20:38) (93% - 100%)  Wt(kg): --  I&O's Summary      Appearance: Normal	  HEENT:   Normal oral mucosa, PERRL, EOMI	  Lymphatic: No lymphadenopathy  Cardiovascular: Normal S1 S2, No JVD, +murmurs, No edema  Respiratory: decrease bs r side s/p chest tube  Psychiatry: confuse,dementia  Gastrointestinal:  Soft, Non-tender, + BS	  Skin: No rashes, No ecchymoses, No cyanosis	  Neurologic: Non-focal  Extremities: Normal range of motion, No clubbing, cyanosis or edema  Vascular: Peripheral pulses palpable 2+ bilaterally    TELEMETRY: 	    ECG:  	  RADIOLOGY:  OTHER: 	  	  LABS:	 	    CARDIAC MARKERS:  CARDIAC MARKERS ( 2019 15:36 )  x     / x     / 51 U/L / x     / 3.9 ng/mL                              12.8   9.1   )-----------( 372      ( 2019 15:36 )             40.4         140  |  104  |  38<H>  ----------------------------<  106<H>  4.3   |  20<L>  |  0.81    Ca    8.7      2019 15:36    TPro  7.2  /  Alb  3.0<L>  /  TBili  0.4  /  DBili  x   /  AST  34  /  ALT  33  /  AlkPhos  106      proBNP:   Lipid Profile:   HgA1c:   TSH:   PT/INR - ( 2019 15:36 )   PT: 14.6 sec;   INR: 1.27 ratio         PTT - ( 2019 15:36 )  PTT:29.9 sec    PREVIOUS DIAGNOSTIC TESTING:    < from: CT Chest No Cont (19 @ 16:58) >  IMPRESSION: Large left pleural effusion with associated atelectasis of   the left upper and lower lobes.    Hyperdense material within the left pleural space suggesting hemothorax   or pleural thickening. (2019 21:10)      PAST MEDICAL HISTORY:  No pertinent past medical history      PAST SURGICAL HISTORY:  No significant past surgical history      MEDICATIONS:  acetaminophen   Tablet .. 500 milliGRAM(s) Oral every 6 hours PRN  aspirin enteric coated 81 milliGRAM(s) Oral daily  digoxin  Injectable 0.125 milliGRAM(s) IV Push daily  diltiazem Injectable 5 milliGRAM(s) IV Push once  multivitamin 1 Tablet(s) Oral daily  oxyCODONE    IR 5 milliGRAM(s) Oral every 6 hours PRN      ALLERGIES:  penicillin G benzathine (Unknown)      VITALS & I/Os:  Vital Signs Last 24 Hrs  T(C): 36.3 (2019 23:30), Max: 37 (2019 14:25)  T(F): 97.3 (2019 23:30), Max: 98.6 (2019 14:25)  HR: 102 (2019 00:50) (70 - 163)  BP: 101/76 (2019 00:50) (70/57 - 124/75)  BP(mean): 71 (2019 23:30) (61 - 91)  RR: 23 (2019 00:32) (16 - 23)  SpO2: 98% (2019 00:32) (93% - 100%)    I&O's Summary      PHYSICAL EXAM:  General: No acute distress  Respiratory: Nonlabored  Cardiovascular: RRR  Abdominal: Soft, nondistended, nontender. No rebound or guarding. No organomegaly, no palpable mass.  Extremities: Warm    LABS:                        12.8   9.1   )-----------( 372      ( 2019 15:36 )             40.4         140  |  104  |  38<H>  ----------------------------<  106<H>  4.3   |  20<L>  |  0.81    Ca    8.7      2019 15:36    TPro  7.2  /  Alb  3.0<L>  /  TBili  0.4  /  DBili  x   /  AST  34  /  ALT  33  /  AlkPhos  106      Lactate:  04- @ 15:36  3.6    PT/INR - ( 2019 15:36 )   PT: 14.6 sec;   INR: 1.27 ratio         PTT - ( 2019 15:36 )  PTT:29.9 sec    CARDIAC MARKERS ( 2019 15:36 )  x     / x     / 51 U/L / x     / 3.9 ng/mL    Urinalysis Basic - ( 2019 15:36 )    Color: Yellow / Appearance: Clear / S.028 / pH: x  Gluc: x / Ketone: Negative  / Bili: Negative / Urobili: Negative   Blood: x / Protein: Trace / Nitrite: Negative   Leuk Esterase: Negative / RBC: 2 /hpf / WBC 1 /HPF   Sq Epi: x / Non Sq Epi: 1 /hpf / Bacteria: Negative      IMAGING:  CT Chest No Cont (19 @ 16:58)   LUNGS AND LARGE AIRWAYS/PLEURA: Patent central airways.  Large left   pleural effusion with associated passive atelectasis of the left upper   and lower lobes. Hyperdense material layering within the left pleural   space suggest hemothorax versus pleural-based disease.      VESSELS: Atherosclerotic disease.    HEART: Heart size is normal. Trace pericardial effusion.    MEDIASTINUM AND JAH: Nolymphadenopathy.    CHEST WALL AND LOWER NECK: Within normal limits.    VISUALIZED UPPER ABDOMEN: Within normal limits.    BONES: Chronic left-sided rib fractures. Degenerative changes of spine.    IMPRESSION: Large left pleural effusion with associated atelectasis of   the left upper and lower lobes.    Hyperdense material within the left pleural space suggesting hemothorax   or pleural thickening.    Findings discussed by Dr. De Leon with Dr. Espana on 2019 at 5:25 PM   with read back.

## 2019-04-03 LAB
ALBUMIN SERPL ELPH-MCNC: 2.3 G/DL — LOW (ref 3.3–5)
ALP SERPL-CCNC: 90 U/L — SIGNIFICANT CHANGE UP (ref 40–120)
ALT FLD-CCNC: 33 U/L — SIGNIFICANT CHANGE UP (ref 10–45)
ANION GAP SERPL CALC-SCNC: 12 MMOL/L — SIGNIFICANT CHANGE UP (ref 5–17)
AST SERPL-CCNC: 30 U/L — SIGNIFICANT CHANGE UP (ref 10–40)
BILIRUB SERPL-MCNC: 0.5 MG/DL — SIGNIFICANT CHANGE UP (ref 0.2–1.2)
BUN SERPL-MCNC: 24 MG/DL — HIGH (ref 7–23)
CALCIUM SERPL-MCNC: 8.4 MG/DL — SIGNIFICANT CHANGE UP (ref 8.4–10.5)
CHLORIDE SERPL-SCNC: 107 MMOL/L — SIGNIFICANT CHANGE UP (ref 96–108)
CO2 SERPL-SCNC: 20 MMOL/L — LOW (ref 22–31)
COMMENT - FLUIDS: SIGNIFICANT CHANGE UP
CREAT SERPL-MCNC: 0.62 MG/DL — SIGNIFICANT CHANGE UP (ref 0.5–1.3)
GLUCOSE SERPL-MCNC: 103 MG/DL — HIGH (ref 70–99)
HCT VFR BLD CALC: 39.5 % — SIGNIFICANT CHANGE UP (ref 34.5–45)
HGB BLD-MCNC: 12.4 G/DL — SIGNIFICANT CHANGE UP (ref 11.5–15.5)
MCHC RBC-ENTMCNC: 29.5 PG — SIGNIFICANT CHANGE UP (ref 27–34)
MCHC RBC-ENTMCNC: 31.4 GM/DL — LOW (ref 32–36)
MCV RBC AUTO: 93.8 FL — SIGNIFICANT CHANGE UP (ref 80–100)
PLATELET # BLD AUTO: 248 K/UL — SIGNIFICANT CHANGE UP (ref 150–400)
POTASSIUM SERPL-MCNC: 3.8 MMOL/L — SIGNIFICANT CHANGE UP (ref 3.5–5.3)
POTASSIUM SERPL-SCNC: 3.8 MMOL/L — SIGNIFICANT CHANGE UP (ref 3.5–5.3)
PROT SERPL-MCNC: 5.8 G/DL — LOW (ref 6–8.3)
RBC # BLD: 4.21 M/UL — SIGNIFICANT CHANGE UP (ref 3.8–5.2)
RBC # FLD: 14.1 % — SIGNIFICANT CHANGE UP (ref 10.3–14.5)
SODIUM SERPL-SCNC: 139 MMOL/L — SIGNIFICANT CHANGE UP (ref 135–145)
WBC # BLD: 9.25 K/UL — SIGNIFICANT CHANGE UP (ref 3.8–10.5)
WBC # FLD AUTO: 9.25 K/UL — SIGNIFICANT CHANGE UP (ref 3.8–10.5)

## 2019-04-03 PROCEDURE — 71045 X-RAY EXAM CHEST 1 VIEW: CPT | Mod: 26

## 2019-04-03 PROCEDURE — 93306 TTE W/DOPPLER COMPLETE: CPT | Mod: 26

## 2019-04-03 PROCEDURE — 99223 1ST HOSP IP/OBS HIGH 75: CPT | Mod: 24

## 2019-04-03 RX ORDER — HEPARIN SODIUM 5000 [USP'U]/ML
5000 INJECTION INTRAVENOUS; SUBCUTANEOUS EVERY 12 HOURS
Qty: 0 | Refills: 0 | Status: DISCONTINUED | OUTPATIENT
Start: 2019-04-03 | End: 2019-04-03

## 2019-04-03 NOTE — CONSULT NOTE ADULT - SUBJECTIVE AND OBJECTIVE BOX
REYNA CLAUDIO  89y Female  MRN:[id number]    Patient is a 89y old  Female who presents with a chief complaint of rapid a,fib (2019 11:15)    HPI:  CHIEF COMPLAINT:Patient is a 89y old  Female who presents with a chief complaint of Left sided pleural effusion (2019 17:47)  89 yo female with dementia presenting with decreased activity and PO for 1 day. Lives in independent living facility, was brought in for decreased communication, PO, and energy for 1 day. family feels that she is less interactive. Denies any pain. brought in with Afib with RVR and hypotensive.  pt recived chest tube with sig egffusion and in rapid a.fib and hypotension.    PREVIOUS DIAGNOSTIC TESTING:    < from: CT Chest No Cont (19 @ 16:58) >  IMPRESSION: Large left pleural effusion with associated atelectasis of   the left upper and lower lobes.    Hyperdense material within the left pleural space suggesting hemothorax   or pleural thickening. (2019 21:10)      Patient seen and evaluated at bedside. No acute events overnight except as noted.    Interval HPI: daughter at bedside, requesting comfort care     PAST MEDICAL & SURGICAL HISTORY:  No pertinent past medical history  No significant past surgical history    SOC:  non smoker  no drug or alcohol abuse    fam hx:  non cont     REVIEW OF SYSTEMS:  unable to obtain due to pts dementia     VITALS:  Vital Signs Last 24 Hrs  T(C): 36.4 (2019 11:32), Max: 36.8 (2019 15:21)  T(F): 97.6 (2019 11:32), Max: 98.3 (2019 15:21)  HR: 90 (2019 11:32) (85 - 101)  BP: 131/82 (2019 11:32) (94/82 - 131/82)  BP(mean): --  RR: 18 (2019 11:32) (18 - 22)  SpO2: 100% (2019 11:32) (93% - 100%)  CAPILLARY BLOOD GLUCOSE        I&O's Summary    2019 07:01  -  2019 07:00  --------------------------------------------------------  IN: 60 mL / OUT: 100 mL / NET: -40 mL        PHYSICAL EXAM:  GENERAL: NAD, well-developed  HEAD:  Atraumatic, Normocephalic  EYES: EOMI, PERRLA, conjunctiva and sclera clear  NECK: Supple,    CHEST/LUNG: coarse bs b/l, L chest tube  HEART: S1, S2;    ABDOMEN: Soft, Nontender, Nondistended; Bowel sounds present  EXTREMITIES:  2+ Peripheral Pulses, No clubbing, cyanosis, or edema  SKIN: No rashes or lesions    Consultant(s) Notes Reviewed:  [x ] YES  [ ] NO  Care Discussed with Consultants/Other Providers [ x] YES  [ ] NO    MEDS:  MEDICATIONS  (STANDING):  aspirin enteric coated 81 milliGRAM(s) Oral daily  digoxin  Injectable 0.125 milliGRAM(s) IV Push daily  heparin  Injectable 5000 Unit(s) SubCutaneous every 12 hours  multivitamin 1 Tablet(s) Oral daily    MEDICATIONS  (PRN):  acetaminophen   Tablet .. 500 milliGRAM(s) Oral every 6 hours PRN Temp greater or equal to 38C (100.4F), Moderate Pain (4 - 6)  oxyCODONE    IR 5 milliGRAM(s) Oral every 6 hours PRN Severe Pain (7 - 10)    ALLERGIES:  penicillin G benzathine (Unknown)      LABS:                        12.4   9.25  )-----------( 248      ( 2019 09:28 )             39.5     04-    139  |  107  |  24<H>  ----------------------------<  103<H>  3.8   |  20<L>  |  0.62    Ca    8.4      2019 06:48    TPro  5.8<L>  /  Alb  2.3<L>  /  TBili  0.5  /  DBili  x   /  AST  30  /  ALT  33  /  AlkPhos  90  04-03    PT/INR - ( 2019 15:36 )   PT: 14.6 sec;   INR: 1.27 ratio         PTT - ( 2019 15:36 )  PTT:29.9 sec  CARDIAC MARKERS ( 2019 15:36 )  x     / x     / 51 U/L / x     / 3.9 ng/mL      LIVER FUNCTIONS - ( 2019 06:48 )  Alb: 2.3 g/dL / Pro: 5.8 g/dL / ALK PHOS: 90 U/L / ALT: 33 U/L / AST: 30 U/L / GGT: x           Urinalysis Basic - ( 2019 15:36 )    Color: Yellow / Appearance: Clear / S.028 / pH: x  Gluc: x / Ketone: Negative  / Bili: Negative / Urobili: Negative   Blood: x / Protein: Trace / Nitrite: Negative   Leuk Esterase: Negative / RBC: 2 /hpf / WBC 1 /HPF   Sq Epi: x / Non Sq Epi: 1 /hpf / Bacteria: Negative      TSH:   A1c:  BNP:  Lipid panel:   cultures: Culture Results:   No growth ( @ 00:21)  Culture Results:   No growth to date. ( @ 21:07)  Culture Results:   No growth ( @ 20:41)      RADIOLOGY & ADDITIONAL TESTS:    Imaging Personally Reviewed:  [ ] YES  [ ] NO

## 2019-04-03 NOTE — PROGRESS NOTE ADULT - SUBJECTIVE AND OBJECTIVE BOX
CARDIOLOGY     PROGRESS  NOTE   ________________________________________________    CHIEF COMPLAINT:Patient is a 89y old  Female who presents with a chief complaint of rapid a,fib (02 Apr 2019 10:07)  doing well.  	  REVIEW OF SYSTEMS:  CONSTITUTIONAL: No fever, weight loss, or fatigue  EYES: No eye pain, visual disturbances, or discharge  ENT:  No difficulty hearing, tinnitus, vertigo; No sinus or throat pain  NECK: No pain or stiffness  RESPIRATORY: No cough, wheezing, chills or hemoptysis; No Shortness of Breath  CARDIOVASCULAR: No chest pain, palpitations, passing out, dizziness, or leg swelling  GASTROINTESTINAL: No abdominal or epigastric pain. No nausea, vomiting, or hematemesis; No diarrhea or constipation. No melena or hematochezia.  GENITOURINARY: No dysuria, frequency, hematuria, or incontinence  NEUROLOGICAL: No headaches, memory loss, loss of strength, numbness, or tremors  SKIN: No itching, burning, rashes, or lesions   LYMPH Nodes: No enlarged glands  ENDOCRINE: No heat or cold intolerance; No hair loss  MUSCULOSKELETAL: No joint pain or swelling; No muscle, back, or extremity pain  PSYCHIATRIC: No depression, anxiety, mood swings, or difficulty sleeping  HEME/LYMPH: No easy bruising, or bleeding gums  ALLERGY AND IMMUNOLOGIC: No hives or eczema	    [ ] All others negative	  [ ] Unable to obtain    PHYSICAL EXAM:  T(C): 36.3 (04-03-19 @ 03:50), Max: 36.8 (04-02-19 @ 15:21)  HR: 85 (04-03-19 @ 03:50) (85 - 101)  BP: 129/77 (04-03-19 @ 03:50) (94/82 - 129/77)  RR: 18 (04-03-19 @ 03:50) (18 - 22)  SpO2: 93% (04-03-19 @ 03:50) (93% - 100%)  Wt(kg): --  I&O's Summary    02 Apr 2019 07:01  -  03 Apr 2019 07:00  --------------------------------------------------------  IN: 60 mL / OUT: 100 mL / NET: -40 mL        Appearance: Normal	  HEENT:   Normal oral mucosa, PERRL, EOMI	  Lymphatic: No lymphadenopathy  Cardiovascular: Normal S1 S2, No JVD, + murmurs, No edema  Respiratory: decrease bs  Psychiatry: A & O x 3, Mood & affect appropriate  Gastrointestinal:  Soft, Non-tender, + BS	  Skin: No rashes, No ecchymoses, No cyanosis	  Neurologic: Non-focal  Extremities: Normal range of motion, No clubbing, cyanosis or edema  Vascular: Peripheral pulses palpable 2+ bilaterally    MEDICATIONS  (STANDING):  aspirin enteric coated 81 milliGRAM(s) Oral daily  digoxin  Injectable 0.125 milliGRAM(s) IV Push daily  multivitamin 1 Tablet(s) Oral daily      TELEMETRY: 	    ECG:  	  RADIOLOGY:  OTHER: 	  	  LABS:	 	    CARDIAC MARKERS:  CARDIAC MARKERS ( 01 Apr 2019 15:36 )  x     / x     / 51 U/L / x     / 3.9 ng/mL                                12.4   9.25  )-----------( 248      ( 03 Apr 2019 09:28 )             39.5     04-03    139  |  107  |  24<H>  ----------------------------<  103<H>  3.8   |  20<L>  |  0.62    Ca    8.4      03 Apr 2019 06:48    TPro  5.8<L>  /  Alb  2.3<L>  /  TBili  0.5  /  DBili  x   /  AST  30  /  ALT  33  /  AlkPhos  90  04-03    proBNP:   Lipid Profile:   HgA1c:   TSH: Thyroid Stimulating Hormone, Serum: 2.03 uIU/mL (04-02 @ 08:59)    PT/INR - ( 01 Apr 2019 15:36 )   PT: 14.6 sec;   INR: 1.27 ratio         PTT - ( 01 Apr 2019 15:36 )  PTT:29.9 sec    < from: Xray Chest 1 View- PORTABLE-Urgent (04.02.19 @ 01:44) >  Partial drainage of a large left pleural effusion status post left   pleural pigtail catheter placement. Residual moderate left pleural   effusion.    < end of copied text >    Assessment and plan  ---------------------------  pt in nsr now  thoracic for chest tube management  awaiting result  pulmonary eval  dvt perophylaxis

## 2019-04-03 NOTE — PROGRESS NOTE ADULT - ASSESSMENT
Gris Dixon is a 89 y.o. woman who presented to the ED on 4/1 for generalized malaise. Per the patient's family, the patient was less interactive, which prompted them to bring the patient into the ED. Found to have a large left pleural effusion; s/p tube thoracostomy with 1400+ of blood-tinged fluid.    4/3 VSS, L pl CT intact, minimal drainage. CXR shows residual L pleural effusion persisting. Pt not in resp distress.     Plan:  - Daily CXR  - Monitor output  - Keep tube to suction  - Patient is DNR/DNI  - No thoracic surgery intervention indicated  - Plan discussed with Dr. Emanuel

## 2019-04-03 NOTE — CONSULT NOTE ADULT - ASSESSMENT
89 yo female no sig known pmhx a/w afib with rvr and large pleural effusion    s/p chest tube  thoracic surg f/u  monitor outpt  daughter requesting chest tube be removed    afib with rvr  now in nsr  cards f/u  No AC  rate control  tele monitor    pt dnr/i  as per daughter with to persue comfort care and remove chest tube  consider palliative eval

## 2019-04-03 NOTE — PROGRESS NOTE ADULT - SUBJECTIVE AND OBJECTIVE BOX
Interval Hx; Events Overnight:  Subjective: Pt states "I am cold, but I am breathing okay." "I have no pain."    LABS:                12.4                 x    | x    | x            9.25  >-----------< 248     ------------------------< x                     39.5                 x    | x    | x                                            Ca x     Mg x     Ph x              VITAL SIGNS       Daily     Daily       Vital Signs Last 24 Hrs  T(C): 36.3 (04-03-19 @ 03:50), Max: 36.8 (04-02-19 @ 15:21)  T(F): 97.4 (04-03-19 @ 03:50), Max: 98.3 (04-02-19 @ 15:21)  HR: 85 (04-03-19 @ 03:50) (85 - 101)  BP: 129/77 (04-03-19 @ 03:50) (94/82 - 129/77)  RR: 18 (04-03-19 @ 03:50) (18 - 22)  SpO2: 93% (04-03-19 @ 03:50) (93% - 100%)             I&O's Detail    02 Apr 2019 07:01  -  03 Apr 2019 07:00  --------------------------------------------------------  IN:    Oral Fluid: 60 mL  Total IN: 60 mL    OUT:    Chest Tube: 100 mL  Total OUT: 100 mL    Total NET: -40 mL                    GLUCOSE  CAPILLARY BLOOD GLUCOSE                Tubes/Lines/Drains      CHEST TUBES:   (L) Pleural: [x  ] pigtail chest tube to lws              PHYSICAL EXAM      General: NAD, well appearing, in no distress  Neurology: A&O x3, non focal, no neuro deficits. Moves all extremities to command.  CV : s1 s2 RRR, no murmurs, gallops, clicks.   Lungs: clear to auscultation, diminished at bases. L pleural ct in place  Abdomen: soft, nontender, nondistended, positive bowel sounds  :    voiding   Extremities:    trace bl edema  edema. + pedal pulses      Skin: intact, no lesions        MEDICATIONS  acetaminophen   Tablet .. 500 milliGRAM(s) Oral every 6 hours PRN  aspirin enteric coated 81 milliGRAM(s) Oral daily  digoxin  Injectable 0.125 milliGRAM(s) IV Push daily  heparin  Injectable 5000 Unit(s) SubCutaneous every 12 hours  multivitamin 1 Tablet(s) Oral daily  oxyCODONE    IR 5 milliGRAM(s) Oral every 6 hours PRN

## 2019-04-04 PROCEDURE — 71045 X-RAY EXAM CHEST 1 VIEW: CPT | Mod: 26

## 2019-04-04 NOTE — PROGRESS NOTE ADULT - SUBJECTIVE AND OBJECTIVE BOX
Interval Hx; Events Overnight:  Subjective: Pt states "I am cold, but I am breathing okay." "I have no pain."      LABS:                12.4                 x    | x    | x            9.25  >-----------< 248     ------------------------< x                     39.5                 x    | x    | x                                            Ca x     Mg x     Ph x              VITAL SIGNS    Telemetry:      Daily     Daily       Vital Signs Last 24 Hrs  T(C): 36.3 (04-04-19 @ 04:20), Max: 36.4 (04-03-19 @ 11:32)  T(F): 97.3 (04-04-19 @ 04:20), Max: 97.6 (04-03-19 @ 11:32)  HR: 106 (04-03-19 @ 21:10) (90 - 106)  BP: 100/56 (04-04-19 @ 04:20) (90/52 - 131/82)  RR: 18 (04-04-19 @ 04:20) (18 - 18)  SpO2: 97% (04-04-19 @ 04:20) (95% - 100%)             I&O's Detail    03 Apr 2019 07:01  -  04 Apr 2019 07:00  --------------------------------------------------------  IN:  Total IN: 0 mL    OUT:  Total OUT: 0 mL    Total NET: 0 mL                    GLUCOSE  CAPILLARY BLOOD GLUCOSE              PHYSICAL EXAM      General: NAD, well appearing, in no distress  Neurology: A&O x3, non focal, no neuro deficits. Moves all extremities to command.  CV : s1 s2 RRR, no murmurs, gallops, clicks.   Lungs: diminished L > R  Abdomen: soft, nontender, nondistended, positive bowel sounds  :    voiding   Extremities:    trace bl edema  edema. + pedal pulses      Skin: intact, no lesions          MEDICATIONS  acetaminophen   Tablet .. 500 milliGRAM(s) Oral every 6 hours PRN  aspirin enteric coated 81 milliGRAM(s) Oral daily  digoxin  Injectable 0.125 milliGRAM(s) IV Push daily  multivitamin 1 Tablet(s) Oral daily  oxyCODONE    IR 5 milliGRAM(s) Oral every 6 hours PRN

## 2019-04-04 NOTE — PROGRESS NOTE ADULT - ASSESSMENT
89 yo female no sig known pmhx a/w afib with rvr and large pleural effusion    s/p chest tube  thoracic surg f/u  chest tube now removed as per family request    afib with rvr  now in nsr  cards f/u  No AC  rate control  tele monitor    pt dnr/i  as per daughter wish to persue comfort care    palliative f/u  dc planning with hospice    d/w primary

## 2019-04-04 NOTE — PROGRESS NOTE ADULT - SUBJECTIVE AND OBJECTIVE BOX
REYNA CLAUDIO  89y Female  MRN:[id number]    Patient is a 89y old  Female who presents with a chief complaint of rapid a,fib (03 Apr 2019 11:15)    HPI:  CHIEF COMPLAINT:Patient is a 89y old  Female who presents with a chief complaint of Left sided pleural effusion (01 Apr 2019 17:47)  89 yo female with dementia presenting with decreased activity and PO for 1 day. Lives in independent living facility, was brought in for decreased communication, PO, and energy for 1 day. family feels that she is less interactive. Denies any pain. brought in with Afib with RVR and hypotensive.  pt recived chest tube with sig egffusion and in rapid a.fib and hypotension.    PREVIOUS DIAGNOSTIC TESTING:    < from: CT Chest No Cont (04.01.19 @ 16:58) >  IMPRESSION: Large left pleural effusion with associated atelectasis of   the left upper and lower lobes.    Hyperdense material within the left pleural space suggesting hemothorax   or pleural thickening. (01 Apr 2019 21:10)      Patient seen and evaluated at bedside. No acute events overnight except as noted.    Interval HPI: chest tube removed    PAST MEDICAL & SURGICAL HISTORY:  No pertinent past medical history  No significant past surgical history    SOC:  non smoker  no drug or alcohol abuse    fam hx:  non cont     REVIEW OF SYSTEMS:  unable to obtain due to pts dementia     VITALS:  Vital Signs Last 24 Hrs  T(C): 36.3 (04 Apr 2019 04:20), Max: 36.3 (03 Apr 2019 21:10)  T(F): 97.3 (04 Apr 2019 04:20), Max: 97.4 (03 Apr 2019 21:10)  HR: 106 (03 Apr 2019 21:10) (106 - 106)  BP: 100/56 (04 Apr 2019 04:20) (90/52 - 100/56)  BP(mean): --  RR: 18 (04 Apr 2019 04:20) (18 - 18)  SpO2: 97% (04 Apr 2019 04:20) (95% - 97%)      PHYSICAL EXAM:  GENERAL: NAD, well-developed  HEAD:  Atraumatic, Normocephalic  EYES: EOMI, PERRLA, conjunctiva and sclera clear  NECK: Supple,    CHEST/LUNG: coarse bs b/l,  HEART: S1, S2;    ABDOMEN: Soft, Nontender, Nondistended; Bowel sounds present  EXTREMITIES:  2+ Peripheral Pulses, No clubbing, cyanosis, or edema  SKIN: No rashes or lesions    Consultant(s) Notes Reviewed:  [x ] YES  [ ] NO  Care Discussed with Consultants/Other Providers [ x] YES  [ ] NO    MEDS:  MEDICATIONS  (STANDING):  aspirin enteric coated 81 milliGRAM(s) Oral daily  digoxin  Injectable 0.125 milliGRAM(s) IV Push daily  multivitamin 1 Tablet(s) Oral daily    MEDICATIONS  (PRN):  acetaminophen   Tablet .. 500 milliGRAM(s) Oral every 6 hours PRN Temp greater or equal to 38C (100.4F), Moderate Pain (4 - 6)  oxyCODONE    IR 5 milliGRAM(s) Oral every 6 hours PRN Severe Pain (7 - 10)      ALLERGIES:  penicillin G benzathine (Unknown)      LABS:                        12.4   9.25  )-----------( 248      ( 03 Apr 2019 09:28 )             39.5   04-03    139  |  107  |  24<H>  ----------------------------<  103<H>  3.8   |  20<L>  |  0.62    Ca    8.4      03 Apr 2019 06:48    TPro  5.8<L>  /  Alb  2.3<L>  /  TBili  0.5  /  DBili  x   /  AST  30  /  ALT  33  /  AlkPhos  90  04-03

## 2019-04-04 NOTE — PROGRESS NOTE ADULT - ASSESSMENT
Gris Dixon is a 89 y.o. woman who presented to the ED on 4/1 for generalized malaise. Per the patient's family, the patient was less interactive, which prompted them to bring the patient into the ED. Found to have a large left pleural effusion; s/p tube thoracostomy with 1400+ of blood-tinged fluid.    4/3 VSS, L pl CT intact, minimal drainage. CXR shows residual L pleural effusion persisting. Pt not in resp distress.   4/4 VSS, no respiratory distress. Monitor CXR. Family requesting palliative route.     Plan:  - Daily CXR  - L CT d/c'd 4/3 per family  - Patient is DNR/DNI  - No thoracic surgery intervention indicated  - Plan discussed with Dr. Emanuel

## 2019-04-04 NOTE — PROGRESS NOTE ADULT - SUBJECTIVE AND OBJECTIVE BOX
CARDIOLOGY     PROGRESS  NOTE   ________________________________________________    CHIEF COMPLAINT:Patient is a 89y old  Female who presents with a chief complaint of rapid a,fib (03 Apr 2019 12:38)  no complain, doing better.  	  REVIEW OF SYSTEMS:  CONSTITUTIONAL: No fever, weight loss, or fatigue  EYES: No eye pain, visual disturbances, or discharge  ENT:  No difficulty hearing, tinnitus, vertigo; No sinus or throat pain  NECK: No pain or stiffness  RESPIRATORY: No cough, wheezing, chills or hemoptysis; decrease Shortness of Breath  CARDIOVASCULAR: No chest pain, palpitations, passing out, dizziness, or leg swelling  GASTROINTESTINAL: No abdominal or epigastric pain. No nausea, vomiting, or hematemesis; No diarrhea or constipation. No melena or hematochezia.  GENITOURINARY: No dysuria, frequency, hematuria, or incontinence  NEUROLOGICAL: No headaches, memory loss, loss of strength, numbness, or tremors  SKIN: No itching, burning, rashes, or lesions   LYMPH Nodes: No enlarged glands  ENDOCRINE: No heat or cold intolerance; No hair loss  MUSCULOSKELETAL: No joint pain or swelling; No muscle, back, or extremity pain  PSYCHIATRIC: No depression, anxiety, mood swings, or difficulty sleeping  HEME/LYMPH: No easy bruising, or bleeding gums  ALLERGY AND IMMUNOLOGIC: No hives or eczema	    [ ] All others negative	  [ ] Unable to obtain    PHYSICAL EXAM:  T(C): 36.3 (04-04-19 @ 04:20), Max: 36.4 (04-03-19 @ 11:32)  HR: 106 (04-03-19 @ 21:10) (90 - 106)  BP: 100/56 (04-04-19 @ 04:20) (90/52 - 131/82)  RR: 18 (04-04-19 @ 04:20) (18 - 18)  SpO2: 97% (04-04-19 @ 04:20) (95% - 100%)  Wt(kg): --  I&O's Summary    03 Apr 2019 07:01  -  04 Apr 2019 07:00  --------------------------------------------------------  IN: 0 mL / OUT: 0 mL / NET: 0 mL        Appearance: Normal	  HEENT:   Normal oral mucosa, PERRL, EOMI	  Lymphatic: No lymphadenopathy  Cardiovascular: Normal S1 S2, No JVD, + murmurs, No edema  Respiratory: decrease bs/chest tube draining.  Psychiatry: A & O x 3, Mood & affect appropriate  Gastrointestinal:  Soft, Non-tender, + BS	  Skin: No rashes, No ecchymoses, No cyanosis	  Neurologic: Non-focal  Extremities: Normal range of motion, No clubbing, cyanosis or edema  Vascular: Peripheral pulses palpable 2+ bilaterally    MEDICATIONS  (STANDING):  aspirin enteric coated 81 milliGRAM(s) Oral daily  digoxin  Injectable 0.125 milliGRAM(s) IV Push daily  multivitamin 1 Tablet(s) Oral daily      TELEMETRY: 	    ECG:  	  RADIOLOGY:  OTHER: 	  	  LABS:	 	    CARDIAC MARKERS:                                12.4   9.25  )-----------( 248      ( 03 Apr 2019 09:28 )             39.5     04-03    139  |  107  |  24<H>  ----------------------------<  103<H>  3.8   |  20<L>  |  0.62    Ca    8.4      03 Apr 2019 06:48    TPro  5.8<L>  /  Alb  2.3<L>  /  TBili  0.5  /  DBili  x   /  AST  30  /  ALT  33  /  AlkPhos  90  04-03    proBNP:   Lipid Profile:   HgA1c:   TSH: Thyroid Stimulating Hormone, Serum: 2.03 uIU/mL (04-02 @ 08:59)  < from: CT Chest No Cont (04.01.19 @ 16:58) >  IMPRESSION: Large left pleural effusion with associated atelectasis of   the left upper and lower lobes.    Hyperdense material within the left pleural space suggesting hemothorax   or pleural thickening.    < end of copied text >          Assessment and plan  ---------------------------  ?dc tele  tele please  repeat chest xray  pulmonary eval  asa daily'  dvt prophylaxis  awaiting fluid pathology CARDIOLOGY     PROGRESS  NOTE   ________________________________________________    CHIEF COMPLAINT:Patient is a 89y old  Female who presents with a chief complaint of rapid a,fib (03 Apr 2019 12:38)  no complain, doing better.  	  REVIEW OF SYSTEMS:  CONSTITUTIONAL: No fever, weight loss, or fatigue  EYES: No eye pain, visual disturbances, or discharge  ENT:  No difficulty hearing, tinnitus, vertigo; No sinus or throat pain  NECK: No pain or stiffness  RESPIRATORY: No cough, wheezing, chills or hemoptysis; decrease Shortness of Breath  CARDIOVASCULAR: No chest pain, palpitations, passing out, dizziness, or leg swelling  GASTROINTESTINAL: No abdominal or epigastric pain. No nausea, vomiting, or hematemesis; No diarrhea or constipation. No melena or hematochezia.  GENITOURINARY: No dysuria, frequency, hematuria, or incontinence  NEUROLOGICAL: No headaches, memory loss, loss of strength, numbness, or tremors  SKIN: No itching, burning, rashes, or lesions   LYMPH Nodes: No enlarged glands  ENDOCRINE: No heat or cold intolerance; No hair loss  MUSCULOSKELETAL: No joint pain or swelling; No muscle, back, or extremity pain  PSYCHIATRIC: No depression, anxiety, mood swings, or difficulty sleeping  HEME/LYMPH: No easy bruising, or bleeding gums  ALLERGY AND IMMUNOLOGIC: No hives or eczema	    [ ] All others negative	  [ ] Unable to obtain    PHYSICAL EXAM:  T(C): 36.3 (04-04-19 @ 04:20), Max: 36.4 (04-03-19 @ 11:32)  HR: 106 (04-03-19 @ 21:10) (90 - 106)  BP: 100/56 (04-04-19 @ 04:20) (90/52 - 131/82)  RR: 18 (04-04-19 @ 04:20) (18 - 18)  SpO2: 97% (04-04-19 @ 04:20) (95% - 100%)  Wt(kg): --  I&O's Summary    03 Apr 2019 07:01  -  04 Apr 2019 07:00  --------------------------------------------------------  IN: 0 mL / OUT: 0 mL / NET: 0 mL        Appearance: Normal	  HEENT:   Normal oral mucosa, PERRL, EOMI	  Lymphatic: No lymphadenopathy  Cardiovascular: Normal S1 S2, No JVD, + murmurs, No edema  Respiratory: decrease bs  Psychiatry: A & O x 3, Mood & affect appropriate  Gastrointestinal:  Soft, Non-tender, + BS	  Skin: No rashes, No ecchymoses, No cyanosis	  Neurologic: Non-focal  Extremities: Normal range of motion, No clubbing, cyanosis or edema  Vascular: Peripheral pulses palpable 2+ bilaterally    MEDICATIONS  (STANDING):  aspirin enteric coated 81 milliGRAM(s) Oral daily  digoxin  Injectable 0.125 milliGRAM(s) IV Push daily  multivitamin 1 Tablet(s) Oral daily      TELEMETRY: 	    ECG:  	  RADIOLOGY:  OTHER: 	  	  LABS:	 	    CARDIAC MARKERS:                                12.4   9.25  )-----------( 248      ( 03 Apr 2019 09:28 )             39.5     04-03    139  |  107  |  24<H>  ----------------------------<  103<H>  3.8   |  20<L>  |  0.62    Ca    8.4      03 Apr 2019 06:48    TPro  5.8<L>  /  Alb  2.3<L>  /  TBili  0.5  /  DBili  x   /  AST  30  /  ALT  33  /  AlkPhos  90  04-03    proBNP:   Lipid Profile:   HgA1c:   TSH: Thyroid Stimulating Hormone, Serum: 2.03 uIU/mL (04-02 @ 08:59)  < from: CT Chest No Cont (04.01.19 @ 16:58) >  IMPRESSION: Large left pleural effusion with associated atelectasis of   the left upper and lower lobes.    Hyperdense material within the left pleural space suggesting hemothorax   or pleural thickening.    < end of copied text >          Assessment and plan  -------------------------    pt family do not want any medical care  palliative care called

## 2019-04-05 DIAGNOSIS — Z71.89 OTHER SPECIFIED COUNSELING: ICD-10-CM

## 2019-04-05 DIAGNOSIS — R06.02 SHORTNESS OF BREATH: ICD-10-CM

## 2019-04-05 DIAGNOSIS — F03.90 UNSPECIFIED DEMENTIA WITHOUT BEHAVIORAL DISTURBANCE: ICD-10-CM

## 2019-04-05 DIAGNOSIS — Z51.5 ENCOUNTER FOR PALLIATIVE CARE: ICD-10-CM

## 2019-04-05 DIAGNOSIS — I48.91 UNSPECIFIED ATRIAL FIBRILLATION: ICD-10-CM

## 2019-04-05 DIAGNOSIS — J90 PLEURAL EFFUSION, NOT ELSEWHERE CLASSIFIED: ICD-10-CM

## 2019-04-05 PROCEDURE — 99497 ADVNCD CARE PLAN 30 MIN: CPT | Mod: 25

## 2019-04-05 PROCEDURE — 99223 1ST HOSP IP/OBS HIGH 75: CPT

## 2019-04-05 PROCEDURE — 99231 SBSQ HOSP IP/OBS SF/LOW 25: CPT | Mod: 24

## 2019-04-05 RX ORDER — MORPHINE SULFATE 50 MG/1
0.5 CAPSULE, EXTENDED RELEASE ORAL
Qty: 0 | Refills: 0 | Status: DISCONTINUED | OUTPATIENT
Start: 2019-04-05 | End: 2019-04-07

## 2019-04-05 RX ADMIN — MORPHINE SULFATE 0.5 MILLIGRAM(S): 50 CAPSULE, EXTENDED RELEASE ORAL at 13:15

## 2019-04-05 RX ADMIN — MORPHINE SULFATE 0.5 MILLIGRAM(S): 50 CAPSULE, EXTENDED RELEASE ORAL at 18:14

## 2019-04-05 RX ADMIN — MORPHINE SULFATE 0.5 MILLIGRAM(S): 50 CAPSULE, EXTENDED RELEASE ORAL at 13:56

## 2019-04-05 RX ADMIN — MORPHINE SULFATE 0.5 MILLIGRAM(S): 50 CAPSULE, EXTENDED RELEASE ORAL at 18:26

## 2019-04-05 NOTE — CONSULT NOTE ADULT - PROBLEM SELECTOR RECOMMENDATION 5
>16 minute spent on ACP, new MOLST completed along with capacity form to reflect current wishes.  DNR/DNI, no abx, no ivf, no artificial nutrition, do not send to hospital and comfort measures only.

## 2019-04-05 NOTE — GOALS OF CARE CONVERSATION - PERSONAL ADVANCE DIRECTIVE - CONVERSATION DETAILS
Pt was referred for hospice by the Medical Team. Met w/ the pt's dtr Isabel, hospice services discussed at length. Dtr is requesting to speak w/ the Palliative Care Team as well. Mtg scheduled w/ the dtr, hospice and Dr Angy Maxwell, PCT 4/5/19 at 12pm. Thank you.
Patient is a 89 year old female with medical history of dementia admitted via the ER with diagnosis of AFib with RVR and left pleural effusion s/p chest tube. Met with pt's daughter/caregiver/HCP Isabel Cordoba, [phone]  at bedside, with Dr. Angy Maxwell, palliative attending, Sarai byrd RN and palliative LCSW.  Patient is a DNR at this time with a 4 day LOS.  Pt resides in independent living at the [facility].     HCP on chart indicating patient's dtr Isabel as primary HCP.  Previouisly completed MOLST on chart invalid and voided.  New MOLST completed by dtr and indicates DNRDNI, Comfort measures only, do not send to the hospital, no feeding tube, no IV fluids do not use antibioitics. Original MOLST returned to family, copy added to chart and copy to be scanned into [employer].      Patient to be transferred into the PCU when bed available.  Please see Dr. Maxwell and Sarai Daniel RN hsp notes for additional information.  Contact card for LCSW provided to family.  Support provided for medical decline.

## 2019-04-05 NOTE — CONSULT NOTE ADULT - PROBLEM SELECTOR RECOMMENDATION 3
on digoxin  if patient loses oral route, would monitor HR and treat symptoms appropriately  no further telemetry monitoring

## 2019-04-05 NOTE — GOALS OF CARE CONVERSATION - PERSONAL ADVANCE DIRECTIVE - TREATMENT GUIDELINES
Comfort measures only/No antibiotics/DNR Order/Do not re-hospitalize/No artificial nutrition/No IV fluids

## 2019-04-05 NOTE — PROGRESS NOTE ADULT - PROBLEM SELECTOR PLAN 1
- L CT d/c'd 4/3 per family  - Patient is DNR/DNI  Pt family defer any surgical intervention at this time.

## 2019-04-05 NOTE — CONSULT NOTE ADULT - ASSESSMENT
89 yo female with dementia presenting with decreased activity and PO for 1 day. Lives in independent living home, was brought in for decreased communication, PO, and energy for 1 day. Patient found to have large pleural effusion s/p chest tube and subsequent removal as family does not want to pursue aggressive interventions. Palliative care consulted for GOC discussion.

## 2019-04-05 NOTE — PROGRESS NOTE ADULT - SUBJECTIVE AND OBJECTIVE BOX
REYNA CLAUDIO  89y Female  MRN:[id number]    Patient is a 89y old  Female who presents with a chief complaint of rapid a,fib (03 Apr 2019 11:15)    HPI:  CHIEF COMPLAINT:Patient is a 89y old  Female who presents with a chief complaint of Left sided pleural effusion (01 Apr 2019 17:47)  89 yo female with dementia presenting with decreased activity and PO for 1 day. Lives in independent living facility, was brought in for decreased communication, PO, and energy for 1 day. family feels that she is less interactive. Denies any pain. brought in with Afib with RVR and hypotensive.  pt recived chest tube with sig egffusion and in rapid a.fib and hypotension.    PREVIOUS DIAGNOSTIC TESTING:    < from: CT Chest No Cont (04.01.19 @ 16:58) >  IMPRESSION: Large left pleural effusion with associated atelectasis of   the left upper and lower lobes.    Hyperdense material within the left pleural space suggesting hemothorax   or pleural thickening. (01 Apr 2019 21:10)      Patient seen and evaluated at bedside. No acute events overnight except as noted.    Interval HPI: no events o/n    PAST MEDICAL & SURGICAL HISTORY:  No pertinent past medical history  No significant past surgical history    SOC:  non smoker  no drug or alcohol abuse    fam hx:  non cont     REVIEW OF SYSTEMS:  unable to obtain due to pts dementia     VITALS:  Vital Signs Last 24 Hrs  T(C): 36.3 (05 Apr 2019 04:02), Max: 36.3 (04 Apr 2019 12:17)  T(F): 97.4 (05 Apr 2019 04:02), Max: 97.4 (05 Apr 2019 04:02)  HR: 82 (05 Apr 2019 04:02) (77 - 82)  BP: 116/61 (05 Apr 2019 04:02) (115/75 - 119/72)  BP(mean): --  RR: 18 (05 Apr 2019 04:02) (17 - 18)  SpO2: 97% (05 Apr 2019 04:02) (95% - 97%)      PHYSICAL EXAM:  GENERAL: NAD, well-developed  HEAD:  Atraumatic, Normocephalic  EYES: EOMI, PERRLA, conjunctiva and sclera clear  NECK: Supple,    CHEST/LUNG: coarse bs b/l,  HEART: S1, S2;    ABDOMEN: Soft, Nontender, Nondistended; Bowel sounds present  EXTREMITIES:  2+ Peripheral Pulses, No clubbing, cyanosis, or edema  SKIN: No rashes or lesions    Consultant(s) Notes Reviewed:  [x ] YES  [ ] NO  Care Discussed with Consultants/Other Providers [ x] YES  [ ] NO    MEDS:  MEDICATIONS  (STANDING):  aspirin enteric coated 81 milliGRAM(s) Oral daily  digoxin  Injectable 0.125 milliGRAM(s) IV Push daily  multivitamin 1 Tablet(s) Oral daily    MEDICATIONS  (PRN):  acetaminophen   Tablet .. 500 milliGRAM(s) Oral every 6 hours PRN Temp greater or equal to 38C (100.4F), Moderate Pain (4 - 6)  oxyCODONE    IR 5 milliGRAM(s) Oral every 6 hours PRN Severe Pain (7 - 10)      ALLERGIES:  penicillin G benzathine (Unknown)      LABS:         no new labs

## 2019-04-05 NOTE — PROGRESS NOTE ADULT - SUBJECTIVE AND OBJECTIVE BOX
VITAL SIGNS    Vital Signs Last 24 Hrs  T(C): 36.3 (04-05-19 @ 04:02), Max: 36.3 (04-05-19 @ 04:02)  T(F): 97.4 (04-05-19 @ 04:02), Max: 97.4 (04-05-19 @ 04:02)  HR: 82 (04-05-19 @ 04:02) (77 - 82)  BP: 116/61 (04-05-19 @ 04:02) (115/75 - 116/61)  RR: 18 (04-05-19 @ 04:02) (17 - 18)  SpO2: 97% (04-05-19 @ 04:02) (95% - 97%)            04-04 @ 07:01  -  04-05 @ 07:00  --------------------------------------------------------  IN: 0 mL / OUT: 600 mL / NET: -600 mL    04-05 @ 07:01  -  04-05 @ 15:22  --------------------------------------------------------  IN: 0 mL / OUT: 0 mL / NET: 0 mL       Daily     Daily   Admit Wt: Drug Dosing Weight  Height (cm): 157.48 (16 Oct 2017 22:41)  Weight (kg): 54.4 (01 Apr 2019 14:25)  BMI (kg/m2): 21.9 (01 Apr 2019 14:25)  BSA (m2): 1.54 (01 Apr 2019 14:25)      CAPILLARY BLOOD GLUCOSE              MEDICATIONS  acetaminophen   Tablet .. 500 milliGRAM(s) Oral every 6 hours PRN  aspirin enteric coated 81 milliGRAM(s) Oral daily  digoxin  Injectable 0.125 milliGRAM(s) IV Push daily  LORazepam   Injectable 0.5 milliGRAM(s) IV Push every 2 hours PRN  morphine  - Injectable 0.5 milliGRAM(s) IV Push every 2 hours PRN  morphine  - Injectable 0.5 milliGRAM(s) IV Push every 2 hours PRN  multivitamin 1 Tablet(s) Oral daily      PHYSICAL EXAM  Subjective: NAD  Neurology: alert , nonfocal, no gross deficits  CV : S1S2  Sternal Wound :  CDI , Stable  Lungs: diminished left mid to base  Abdomen: soft, NT,ND, (+ )BM  :  voiding  Extremities:   -c/c/e    LABS                             PAST MEDICAL & SURGICAL HISTORY:  No pertinent past medical history  No significant past surgical history

## 2019-04-05 NOTE — PROGRESS NOTE ADULT - ASSESSMENT
Gris Dixon is a 89 y.o. woman who presented to the ED on 4/1 for generalized malaise. Per the patient's family, the patient was less interactive, which prompted them to bring the patient into the ED. Found to have a large left pleural effusion; s/p tube thoracostomy with 1400+ of blood-tinged fluid.    4/3 VSS, L pl CT intact, minimal drainage. CXR shows residual L pleural effusion persisting. Pt not in resp distress.   4/4 VSS, no respiratory distress. Monitor CXR. Family requesting palliative route. Gris Dixon is a 89 y.o. woman who presented to the ED on 4/1 for generalized malaise. Per the patient's family, the patient was less interactive, which prompted them to bring the patient into the ED. Found to have a large left pleural effusion; s/p tube thoracostomy with 1400+ of blood-tinged fluid.    4/3 VSS, L pl CT intact, minimal drainage. CXR shows residual L pleural effusion persisting. Pt not in resp distress.   4/4 VSS, no respiratory distress. Monitor CXR. Family requesting palliative care

## 2019-04-05 NOTE — CONSULT NOTE ADULT - SUBJECTIVE AND OBJECTIVE BOX
HPI:  CHIEF COMPLAINT:Patient is a 89y old  Female who presents with a chief complaint of Left sided pleural effusion (01 Apr 2019 17:47)      HPI:  6 yo female with dementia presenting with decreased activity and PO for 1 day. Lives in independent living home, was brought in for decreased communication, PO, and energy for 1 day. family feels that she is less interactive. Denies any pain. brought in with Afib with RVR and hypotensive.  pt recived chest tube with sig egffusion and in rapid a.fib and hypotension.    PAST MEDICAL & SURGICAL HISTORY:  No pertinent past medical history  No significant past surgical history      MEDICATIONS  (STANDING):    MEDICATIONS  (PRN):      FAMILY HISTORY:  No pertinent family history in first degree relatives      SOCIAL HISTORY:    [ ] Non-smoker  [ ] Smoker  [ ] Alcohol    Allergies    penicillin G benzathine (Unknown)    Intolerances    	    REVIEW OF SYSTEMS:  CONSTITUTIONAL: No fever, weight loss, or fatigue  EYES: No eye pain, visual disturbances, or discharge  ENT:  No difficulty hearing, tinnitus, vertigo; No sinus or throat pain  NECK: No pain or stiffness  RESPIRATORY: No cough, wheezing, chills or hemoptysis;+ Shortness of Breath  CARDIOVASCULAR: No chest pain, palpitations, passing out, dizziness, or leg swelling  GASTROINTESTINAL: No abdominal or epigastric pain. No nausea, vomiting, or hematemesis; No diarrhea or constipation. No melena or hematochezia.  GENITOURINARY: No dysuria, frequency, hematuria, or incontinence  NEUROLOGICAL: No headaches, memory loss, loss of strength, numbness, or tremors  SKIN: No itching, burning, rashes, or lesions   LYMPH Nodes: No enlarged glands  ENDOCRINE: No heat or cold intolerance; No hair loss  MUSCULOSKELETAL: No joint pain or swelling; No muscle, back, or extremity pain  PSYCHIATRIC: No depression, anxiety, mood swings, or difficulty sleeping  HEME/LYMPH: No easy bruising, or bleeding gums  ALLERGY AND IMMUNOLOGIC: No hives or eczema	    [ ] All others negative	  [x ] Unable to obtain    PHYSICAL EXAM:  T(C): 36.9 (04-01-19 @ 14:45), Max: 37 (04-01-19 @ 14:25)  HR: 90 (04-01-19 @ 20:38) (70 - 163)  BP: 124/75 (04-01-19 @ 20:38) (70/57 - 124/75)  RR: 16 (04-01-19 @ 20:38) (16 - 22)  SpO2: 95% (04-01-19 @ 20:38) (93% - 100%)  Wt(kg): --  I&O's Summary      Appearance: Normal	  HEENT:   Normal oral mucosa, PERRL, EOMI	  Lymphatic: No lymphadenopathy  Cardiovascular: Normal S1 S2, No JVD, +murmurs, No edema  Respiratory: decrease bs r side s/p chest tube  Psychiatry: confuse,dementia  Gastrointestinal:  Soft, Non-tender, + BS	  Skin: No rashes, No ecchymoses, No cyanosis	  Neurologic: Non-focal  Extremities: Normal range of motion, No clubbing, cyanosis or edema  Vascular: Peripheral pulses palpable 2+ bilaterally    TELEMETRY: 	    ECG:  	  RADIOLOGY:  OTHER: 	  	  LABS:	 	    CARDIAC MARKERS:  CARDIAC MARKERS ( 01 Apr 2019 15:36 )  x     / x     / 51 U/L / x     / 3.9 ng/mL                              12.8   9.1   )-----------( 372      ( 01 Apr 2019 15:36 )             40.4     04-01    140  |  104  |  38<H>  ----------------------------<  106<H>  4.3   |  20<L>  |  0.81    Ca    8.7      01 Apr 2019 15:36    TPro  7.2  /  Alb  3.0<L>  /  TBili  0.4  /  DBili  x   /  AST  34  /  ALT  33  /  AlkPhos  106  04-01    proBNP:   Lipid Profile:   HgA1c:   TSH:   PT/INR - ( 01 Apr 2019 15:36 )   PT: 14.6 sec;   INR: 1.27 ratio         PTT - ( 01 Apr 2019 15:36 )  PTT:29.9 sec    PREVIOUS DIAGNOSTIC TESTING:    < from: CT Chest No Cont (04.01.19 @ 16:58) >  IMPRESSION: Large left pleural effusion with associated atelectasis of   the left upper and lower lobes.    Hyperdense material within the left pleural space suggesting hemothorax   or pleural thickening. (01 Apr 2019 21:10)    PERTINENT PM/SXH:   No pertinent past medical history    No significant past surgical history    FAMILY HISTORY:  No pertinent family history in first degree relatives    ITEMS NOT CHECKED ARE NOT PRESENT    SOCIAL HISTORY:   Significant other/partner:  [ ]  Children:  [ ]  Hinduism/Spirituality:  Substance hx:  [ ]   Tobacco hx:  [ ]   Alcohol hx: [ ]   Home Opioid hx:  [ ] I-Stop Reference No:  Living Situation: [ ]Home  [ ]Long term care  [ ]Rehab [ ]Other    ADVANCE DIRECTIVES:    DNR  Yes  MOLST  [ ]  Living Will  [ ]   DECISION MAKER(s):  [ ] Health Care Proxy(s)  [ ] Surrogate(s)  [ ] Guardian           Name(s): Phone Number(s):    BASELINE (I)ADL(s) (prior to admission):  [place]: [ ]Total  [ ] Moderate [ ]Dependent    Allergies    penicillin G benzathine (Unknown)    Intolerances    MEDICATIONS  (STANDING):  aspirin enteric coated 81 milliGRAM(s) Oral daily  digoxin  Injectable 0.125 milliGRAM(s) IV Push daily  multivitamin 1 Tablet(s) Oral daily    MEDICATIONS  (PRN):  acetaminophen   Tablet .. 500 milliGRAM(s) Oral every 6 hours PRN Temp greater or equal to 38C (100.4F), Moderate Pain (4 - 6)  LORazepam   Injectable 0.5 milliGRAM(s) IV Push every 2 hours PRN anxiety/agitation  morphine  - Injectable 0.5 milliGRAM(s) IV Push every 2 hours PRN shortness of breath  morphine  - Injectable 0.5 milliGRAM(s) IV Push every 2 hours PRN pain/discomfort    PRESENT SYMPTOMS: [ ]Unable to obtain due to poor mentation   Source if other than patient:  [ ]Family   [ ]Team     Pain (Impact on QOL):    Location -         Minimal acceptable level (0-10 scale):                    Aggrevating factors -  Quality -  Radiation -  Severity (0-10 scale) -    Timing -    PAIN AD Score:     [email or web address].[email or web address]/[email or web address] (press ctrl +  left click to view)    Dyspnea:                           [ ]Mild [ ]Moderate [ ]Severe  Anxiety:                             [ ]Mild [ ]Moderate [ ]Severe  Fatigue:                             [ ]Mild [ ]Moderate [ ]Severe  Nausea:                             [ ]Mild [ ]Moderate [ ]Severe  Loss of appetite:              [ ]Mild [ ]Moderate [ ]Severe  Constipation:                    [ ]Mild [ ]Moderate [ ]Severe    Other Symptoms:  [ ]All other review of systems negative     Karnofsky Performance Score/Palliative Performance Status Version 2:         %  PHYSICAL EXAM:  Vital Signs Last 24 Hrs  T(C): 36.3 (05 Apr 2019 04:02), Max: 36.3 (05 Apr 2019 04:02)  T(F): 97.4 (05 Apr 2019 04:02), Max: 97.4 (05 Apr 2019 04:02)  HR: 82 (05 Apr 2019 04:02) (77 - 82)  BP: 116/61 (05 Apr 2019 04:02) (115/75 - 116/61)  BP(mean): --  RR: 18 (05 Apr 2019 04:02) (17 - 18)  SpO2: 97% (05 Apr 2019 04:02) (95% - 97%) I&O's Summary    04 Apr 2019 07:01  -  05 Apr 2019 07:00  --------------------------------------------------------  IN: 0 mL / OUT: 600 mL / NET: -600 mL    GENERAL:  [ ]Alert  [ ]Oriented x   [ ]Lethargic  [ ]Cachexia  [ ]Unarousable  [ ]Verbal  [ ]Non-Verbal  Behavioral:   [ ] Anxiety  [ ] Delirium [ ] Agitation [ ] Other  HEENT:  [ ]Normal   [ ]Dry mouth   [ ]ET Tube/Trach  [ ]Oral lesions  PULMONARY:   [ ]Clear [ ]Tachypnea  [ ]Audible excessive secretions   [ ]Rhonchi        [ ]Right [ ]Left [ ]Bilateral  [ ]Crackles        [ ]Right [ ]Left [ ]Bilateral  [ ]Wheezing     [ ]Right [ ]Left [ ]Bilateral  CARDIOVASCULAR:    [ ]Regular [ ]Irregular [ ]Tachy  [ ]Amado [ ]Murmur [ ]Other  GASTROINTESTINAL:  [ ]Soft  [ ]Distended   [ ]+BS  [ ]Non tender [ ]Tender  [ ]PEG [ ]OGT/ NGT  Last BM:   GENITOURINARY:  [ ]Normal [ ] Incontinent   [ ]Oliguria/Anuria   [ ]Guadalupe  MUSCULOSKELETAL:   [ ]Normal   [ ]Weakness  [ ]Bed/Wheelchair bound [ ]Edema  NEUROLOGIC:   [ ]No focal deficits  [ ] Cognitive impairment  [ ] Dysphagia [ ]Dysarthria [ ] Paresis [ ]Other   SKIN:   [ ]Normal   [ ]Pressure ulcer(s)  [ ]Rash    CRITICAL CARE:  [ ] Shock Present  [ ]Septic [ ]Cardiogenic [ ]Neurologic [ ]Hypovolemic  [ ]  Vasopressors [ ]  Inotropes   [ ] Respiratory failure present  [ ] Acute  [ ] Chronic [ ] Hypoxic  [ ] Hypercarbic [ ] Other  [ ] Other organ failure     LABS:            RADIOLOGY & ADDITIONAL STUDIES:    PROTEIN CALORIE MALNUTRITION:   [ ] PPSV2 < or = to 30% [ ] significant weight loss  [ ] poor nutritional intake [ ] catabolic state [ ] anasarca     Albumin, Serum: 2.3 g/dL (04-03-19 @ 06:48)  Artificial Nutrition [ ]     REFERRALS:   [ ]Chaplaincy  [ ] Hospice  [ ]Child Life  [ ]Social Work  [ ]Case management [ ]Holistic Therapy   Goals of Care Discussion Document: Goals of Care Conversation - Personal Advance Directive [VLADIMIR Daniel] (04-04-19 @ 20:32) CHIEF COMPLAINT:Patient is a 89y old  Female who presents with a chief complaint of Left sided pleural effusion (01 Apr 2019 17:47)    HPI:  89 yo female with dementia presenting with decreased activity and PO for 1 day. Lives in independent living home, was brought in for decreased communication, PO, and energy for 1 day. family feels that she is less interactive. Denies any pain. brought in with Afib with RVR and hypotensive.  pt recived chest tube with sig egffusion and in rapid a.fib and hypotension.  CXR/CT chest showing: Hyperdense material within the left pleural space suggesting hemothorax   or pleural thickening. (01 Apr 2019 21:10)    Palliative care consulted to assist with goals of care discussion. Family had met with [facility] on 4/4/19 to discuss hospice services. Met with daughter Isabel, PCSW and [facility] representative to discuss current clinical situation. Discussed known results of pleural fluid, no cytology seen in results. Explained that in the absence of this information, it seems as though the goals are still clear, with focus to be on symptom management. Daughter Isabel would be open to taking the patient back to [facility] with hospice services but would need to look into hiring a private aide of additional time. She was also interested in hearing about the unit in the hospital vs. inpatient options. Explained the difference between PCU and inpatient hospice. Patient today moaning without any stimulation not taking in any food and minimal liquids intake. Also appears tachypneic on examination. Discussed with daughter possible transition to PCU and then to inpatient hospice vs. home with services if symptoms stabilize. We completed new MOLST form to reflect current wishes. HCP copy in chart. Patient to transfer to PCU when bed available for symptom management, medications started for pain/sob, anxiety/agitation and secretions.     PERTINENT PM/SXH:   No pertinent past medical history      FAMILY HISTORY:  No pertinent family history in first degree relatives    ITEMS NOT CHECKED ARE NOT PRESENT    SOCIAL HISTORY:   Significant other/partner:  [ ]  Children:  [x ]  Yazidi/Spirituality:  Substance hx:  [ ]   Tobacco hx:  [ ]   Alcohol hx: [ ]   Home Opioid hx:  [ ] I-Stop Reference No:  Living Situation: [ ]Home  [ ]Long term care  [ ]Rehab [x ]Other- Assisted living    ADVANCE DIRECTIVES:    DNR  Yes  MOLST  [ x]  Living Will  [ ]   DECISION MAKER(s):  [ x] Health Care Proxy(s)  [ ] Surrogate(s)  [ ] Guardian           Name(s): Phone Number(s): Isabel (daughter)    BASELINE (I)ADL(s) (prior to admission):  [place]: [ ]Total  [x ] Moderate [ ]Dependent    Allergies    penicillin G benzathine (Unknown)    Intolerances    MEDICATIONS  (STANDING):  aspirin enteric coated 81 milliGRAM(s) Oral daily  digoxin  Injectable 0.125 milliGRAM(s) IV Push daily  multivitamin 1 Tablet(s) Oral daily    MEDICATIONS  (PRN):  acetaminophen   Tablet .. 500 milliGRAM(s) Oral every 6 hours PRN Temp greater or equal to 38C (100.4F), Moderate Pain (4 - 6)  LORazepam   Injectable 0.5 milliGRAM(s) IV Push every 2 hours PRN anxiety/agitation  morphine  - Injectable 0.5 milliGRAM(s) IV Push every 2 hours PRN shortness of breath  morphine  - Injectable 0.5 milliGRAM(s) IV Push every 2 hours PRN pain/discomfort    PRESENT SYMPTOMS: [ ]Unable to obtain due to poor mentation   Source if other than patient:  [ ]Family   [ ]Team     Pain (Impact on QOL):    Location -         Minimal acceptable level (0-10 scale):                    Aggrevating factors -  Quality -  Radiation -  Severity (0-10 scale) -    Timing -    PAIN AD Score:     http://geriatrictoolkit.[email or web address]/[email or web address] (press ctrl +  left click to view)    Dyspnea:                           [ ]Mild [x ]Moderate [ ]Severe  Anxiety:                             [ ]Mild [ ]Moderate [ ]Severe  Fatigue:                             [ ]Mild [ ]Moderate [ ]Severe  Nausea:                             [ ]Mild [ ]Moderate [ ]Severe  Loss of appetite:              [ ]Mild [x ]Moderate [ ]Severe  Constipation:                    [ ]Mild [ ]Moderate [ ]Severe    Other Symptoms:  [ ]All other review of systems negative     Karnofsky Performance Score/Palliative Performance Status Version 2:        20-30 %  PHYSICAL EXAM:  Vital Signs Last 24 Hrs  T(C): 36.3 (05 Apr 2019 04:02), Max: 36.3 (05 Apr 2019 04:02)  T(F): 97.4 (05 Apr 2019 04:02), Max: 97.4 (05 Apr 2019 04:02)  HR: 82 (05 Apr 2019 04:02) (77 - 82)  BP: 116/61 (05 Apr 2019 04:02) (115/75 - 116/61)  BP(mean): --  RR: 18 (05 Apr 2019 04:02) (17 - 18)  SpO2: 97% (05 Apr 2019 04:02) (95% - 97%) I&O's Summary    04 Apr 2019 07:01  -  05 Apr 2019 07:00  --------------------------------------------------------  IN: 0 mL / OUT: 600 mL / NET: -600 mL    GENERAL:  [ ]Alert  [x ]Oriented x1   [ x]Lethargic  [ ]Cachexia  [ ]Unarousable  [ ]Verbal  [ ]Non-Verbal  Behavioral:   [ ] Anxiety  [ ] Delirium [x ] Agitation [ ] Other  HEENT:  [ ]Normal   [ x]Dry mouth   [ ]ET Tube/Trach  [ ]Oral lesions  PULMONARY:   [ ]Clear [ ]Tachypnea  [ ]Audible excessive secretions   [ ]Rhonchi        [ ]Right [ ]Left [ ]Bilateral  [x ]Crackles        [ ]Right [ ]Left [ ]Bilateral  [ ]Wheezing     [ ]Right [ ]Left [ ]Bilateral  CARDIOVASCULAR:    [x ]Regular [ ]Irregular [ ]Tachy  [ ]Amado [ ]Murmur [ ]Other  GASTROINTESTINAL:  [x ]Soft  [ ]Distended   [ x]+BS  [ x]Non tender [ ]Tender  [ ]PEG [ ]OGT/ NGT   GENITOURINARY:  [ ]Normal [x ] Incontinent   [ ]Oliguria/Anuria   [ ]Guadalupe  MUSCULOSKELETAL:   [ ]Normal   [x ]Weakness  [ ]Bed/Wheelchair bound [ ]Edema  NEUROLOGIC:   [ ]No focal deficits  [x ] Cognitive impairment  [ ] Dysphagia [ ]Dysarthria [ ] Paresis [ ]Other   SKIN: ecchymoses  [ ]Normal   [ ]Pressure ulcer(s)  [ ]Rash    CRITICAL CARE:  [ ] Shock Present  [ ]Septic [ ]Cardiogenic [ ]Neurologic [ ]Hypovolemic  [ ]  Vasopressors [ ]  Inotropes   [ ] Respiratory failure present  [ ] Acute  [ ] Chronic [ ] Hypoxic  [ ] Hypercarbic [ ] Other  [ ] Other organ failure     LABS: no new labs    RADIOLOGY & ADDITIONAL STUDIES:   < from: CT Chest No Cont (04.01.19 @ 16:58) >    EXAM:  CT CHEST                            PROCEDURE DATE:  04/01/2019            INTERPRETATION:  CLINICAL INFORMATION: Pleural effusion. Evaluate for   pneumonia.    COMPARISON: CTA chest 5/20/2016.    PROCEDURE:   CT of the Chest was performed without intravenous contrast.  Sagittal and coronal reformats were performed.      FINDINGS:    CHEST:     LUNGS AND LARGE AIRWAYS/PLEURA: Patent central airways.  Large left   pleural effusion with associated passive atelectasis of the left upper   and lower lobes. Hyperdense material layering within the left pleural   space suggest hemothorax versus pleural-based disease.      VESSELS: Atherosclerotic disease.    HEART: Heart size is normal. Trace pericardial effusion.    MEDIASTINUM AND JAH: Nolymphadenopathy.    CHEST WALL AND LOWER NECK: Within normal limits.    VISUALIZED UPPER ABDOMEN: Within normal limits.    BONES: Chronic left-sided rib fractures. Degenerative changes of spine.    IMPRESSION: Large left pleural effusion with associated atelectasis of   the left upper and lower lobes.    Hyperdense material within the left pleural space suggesting hemothorax   or pleural thickening.    Findings discussed by Dr. Velasco with Dr. Espana on 4/1/2019 at 5:25 PM   with read back.       CHELSEA VELASCO M.D., RADIOLOGY RESIDENT  This document has been electronically signed.  KAJAL MOREJON M.D., ATTENDING RADIOLOGIST  This document has been electronically signed. Apr 1 2019  5:29PM  < end of copied text >    PROTEIN CALORIE MALNUTRITION:   [x ] PPSV2 < or = to 30% [ ] significant weight loss  [ ] poor nutritional intake [x ] catabolic state [ ] anasarca     Albumin, Serum: 2.3 g/dL (04-03-19 @ 06:48)  Artificial Nutrition [ ]     REFERRALS:   [ ]Chaplaincy  [x ] Hospice  [ ]Child Life  [ ]Social Work  [ ]Case management [ ]Holistic Therapy   Goals of Care Discussion Document: Goals of Care Conversation - Personal Advance Directive [VLADIMIR Daniel] (04-04-19 @ 20:32)

## 2019-04-05 NOTE — CONSULT NOTE ADULT - PROBLEM SELECTOR RECOMMENDATION 9
Patient tachypneic today on examination with subjective feeling of shortness of breath  Renal function, WNL- will start morphine 0.5mg IV q2h prn for sob/pain

## 2019-04-05 NOTE — PROGRESS NOTE ADULT - ASSESSMENT
89 yo female no sig known pmhx a/w afib with rvr and large pleural effusion    s/p chest tube  thoracic surg f/u  chest tube now removed as per family request    afib with rvr  now in nsr  cards f/u  No AC  rate control  tele monitor    pt dnr/i  as per daughter wish to persue comfort care    palliative f/u  dc planning with hospice

## 2019-04-05 NOTE — CONSULT NOTE ADULT - PROBLEM SELECTOR RECOMMENDATION 6
Unclear cause of acute deterioration at this time, but family requesting no further work up. Life expectancy unclear. Family on board with transfer to PCU for symptom management. Patient approved for inpatient hospice at [facility] but family requesting to stay in hospital for now. Possible hospice dispo back to [facility] if symptoms stabilize. Medications ordered for symptom management.  No beds currently available in PCU but will transfer when able. Please call with questions.

## 2019-04-05 NOTE — CONSULT NOTE ADULT - PROBLEM SELECTOR RECOMMENDATION 2
s/p chest tube and subsequent removal  pleural fluid not showing any signs of infection, ?cytology   discussed with daughter that whether or not cytology was sent vs. even if it was positive or negative, the information would likely not change course of management as goals are consistent with comfort and symptom approach  no further interventions

## 2019-04-05 NOTE — PROGRESS NOTE ADULT - SUBJECTIVE AND OBJECTIVE BOX
CARDIOLOGY     PROGRESS  NOTE   ________________________________________________    CHIEF COMPLAINT:Patient is a 89y old  Female who presents with a chief complaint of rapid a,fib (05 Apr 2019 10:56)    	  REVIEW OF SYSTEMS:  CONSTITUTIONAL: No fever, weight loss, or fatigue  EYES: No eye pain, visual disturbances, or discharge  ENT:  No difficulty hearing, tinnitus, vertigo; No sinus or throat pain  NECK: No pain or stiffness  RESPIRATORY: No cough, wheezing, chills or hemoptysis; No Shortness of Breath  CARDIOVASCULAR: No chest pain, palpitations, passing out, dizziness, or leg swelling  GASTROINTESTINAL: No abdominal or epigastric pain. No nausea, vomiting, or hematemesis; No diarrhea or constipation. No melena or hematochezia.  GENITOURINARY: No dysuria, frequency, hematuria, or incontinence  NEUROLOGICAL: No headaches, memory loss, loss of strength, numbness, or tremors  SKIN: No itching, burning, rashes, or lesions   LYMPH Nodes: No enlarged glands  ENDOCRINE: No heat or cold intolerance; No hair loss  MUSCULOSKELETAL: No joint pain or swelling; No muscle, back, or extremity pain  PSYCHIATRIC: No depression, anxiety, mood swings, or difficulty sleeping  HEME/LYMPH: No easy bruising, or bleeding gums  ALLERGY AND IMMUNOLOGIC: No hives or eczema	    [ ] All others negative	  [ ] Unable to obtain    PHYSICAL EXAM:  T(C): 36.3 (04-05-19 @ 04:02), Max: 36.3 (04-04-19 @ 12:17)  HR: 82 (04-05-19 @ 04:02) (77 - 82)  BP: 116/61 (04-05-19 @ 04:02) (115/75 - 119/72)  RR: 18 (04-05-19 @ 04:02) (17 - 18)  SpO2: 97% (04-05-19 @ 04:02) (95% - 97%)  Wt(kg): --  I&O's Summary    04 Apr 2019 07:01  -  05 Apr 2019 07:00  --------------------------------------------------------  IN: 0 mL / OUT: 600 mL / NET: -600 mL        Appearance: Normal	  HEENT:   Normal oral mucosa, PERRL, EOMI	  Lymphatic: No lymphadenopathy  Cardiovascular: Normal S1 S2, No JVD, + murmurs, No edema  Respiratory: decrease bs	  Psychiatry: A & O x 3, Mood & affect appropriate  Gastrointestinal:  Soft, Non-tender, + BS	  Skin: No rashes, No ecchymoses, No cyanosis	  Neurologic: Non-focal  Extremities: Normal range of motion, No clubbing, cyanosis or edema  Vascular: Peripheral pulses palpable 2+ bilaterally    MEDICATIONS  (STANDING):  aspirin enteric coated 81 milliGRAM(s) Oral daily  digoxin  Injectable 0.125 milliGRAM(s) IV Push daily  multivitamin 1 Tablet(s) Oral daily      TELEMETRY: 	    ECG:  	  RADIOLOGY:  OTHER: 	  	  LABS:	 	    CARDIAC MARKERS:                  proBNP:   Lipid Profile:   HgA1c:   TSH: Thyroid Stimulating Hormone, Serum: 2.03 uIU/mL (04-02 @ 08:59)          Assessment and plan  ---------------------------  pt family refuses any work up  awaiting hospice

## 2019-04-06 LAB
CULTURE RESULTS: NO GROWTH — SIGNIFICANT CHANGE UP
CULTURE RESULTS: SIGNIFICANT CHANGE UP
SPECIMEN SOURCE: SIGNIFICANT CHANGE UP
SPECIMEN SOURCE: SIGNIFICANT CHANGE UP

## 2019-04-06 PROCEDURE — 99233 SBSQ HOSP IP/OBS HIGH 50: CPT

## 2019-04-06 RX ORDER — ACETAMINOPHEN 500 MG
650 TABLET ORAL EVERY 6 HOURS
Qty: 0 | Refills: 0 | Status: DISCONTINUED | OUTPATIENT
Start: 2019-04-06 | End: 2019-04-11

## 2019-04-06 RX ADMIN — Medication 0.12 MILLIGRAM(S): at 15:18

## 2019-04-06 RX ADMIN — MORPHINE SULFATE 0.5 MILLIGRAM(S): 50 CAPSULE, EXTENDED RELEASE ORAL at 18:16

## 2019-04-06 RX ADMIN — MORPHINE SULFATE 0.5 MILLIGRAM(S): 50 CAPSULE, EXTENDED RELEASE ORAL at 05:02

## 2019-04-06 RX ADMIN — MORPHINE SULFATE 0.5 MILLIGRAM(S): 50 CAPSULE, EXTENDED RELEASE ORAL at 00:24

## 2019-04-06 RX ADMIN — MORPHINE SULFATE 0.5 MILLIGRAM(S): 50 CAPSULE, EXTENDED RELEASE ORAL at 13:18

## 2019-04-06 RX ADMIN — MORPHINE SULFATE 0.5 MILLIGRAM(S): 50 CAPSULE, EXTENDED RELEASE ORAL at 04:47

## 2019-04-06 RX ADMIN — MORPHINE SULFATE 0.5 MILLIGRAM(S): 50 CAPSULE, EXTENDED RELEASE ORAL at 00:39

## 2019-04-06 NOTE — PROGRESS NOTE ADULT - SUBJECTIVE AND OBJECTIVE BOX
GAP TEAM PALLIATIVE CARE UNIT PROGRESS NOTE:     [  ] Patient on hospice program.    INDICATION FOR PALLIATIVE CARE UNIT SERVICES: pain, dyspnea.     INTERVAL HPI/OVERNIGHT EVENTS: Patient transferred to PCU. Patient received 4 doses of morphine 0.5 mg IV since yesterday. Currently in bed, denies pain. I met with daughter at bedside. She does not have oral rout. Will stop ASA and Digoxin as per family request. Patient is awake but is not able to talk.       DNR on chart: Yes    Allergies    penicillin G benzathine (Unknown)    Intolerances    MEDICATIONS  (STANDING):    MEDICATIONS  (PRN):  acetaminophen  Suppository .. 650 milliGRAM(s) Rectal every 6 hours PRN Temp greater or equal to 38C (100.4F)  LORazepam   Injectable 0.5 milliGRAM(s) IV Push every 2 hours PRN anxiety/agitation  morphine  - Injectable 0.5 milliGRAM(s) IV Push every 2 hours PRN shortness of breath  morphine  - Injectable 0.5 milliGRAM(s) IV Push every 2 hours PRN pain/discomfort    ITEMS UNCHECKED ARE NOT PRESENT    PRESENT SYMPTOMS: [ ]Unable to obtain due to poor mentation   Source if other than patient:  [ ]Family   [ ]Team     Pain (Impact on QOL):  denies pain   Location:       Minimal acceptable level (0-10 scale):                    Aggrevating factors:  Quality:  Radiation:  Severity (0-10 scale):     Dyspnea:                           [ ]Mild [X ]Moderate [ ]Severe  Anxiety:                             [ ]Mild [ ]Moderate [ ]Severe  Fatigue:                             [ ]Mild [ ]Moderate [ ]Severe  Nausea:                             [ ]Mild [ ]Moderate [ ]Severe  Loss of appetite:              [ ]Mild [X ]Moderate [ ]Severe  Constipation:                    [ ]Mild [ ]Moderate [ ]Severe    PAINAD Score:    [email or web address] (Ctrl +  left click to view)  		  Other Symptoms:  [ ]All other review of systems negative     Karnofsky Performance Score/Palliative Performance Status Version 2:       20-30  %  PHYSICAL EXAM:  Vital Signs Last 24 Hrs  T(C): 36.5 (06 Apr 2019 08:49), Max: 36.5 (06 Apr 2019 08:49)  T(F): 97.7 (06 Apr 2019 08:49), Max: 97.7 (06 Apr 2019 08:49)  HR: 82 (06 Apr 2019 08:49) (76 - 82)  BP: 95/53 (06 Apr 2019 08:49) (95/53 - 120/76)  BP(mean): --  RR: 18 (06 Apr 2019 08:49) (18 - 18)  SpO2: 99% (06 Apr 2019 08:49) (97% - 99%) I&O's Summary    05 Apr 2019 07:01  -  06 Apr 2019 07:00  --------------------------------------------------------  IN: 60 mL / OUT: 300 mL / NET: -240 mL    GENERAL:  [ ]Alert  [x ]Oriented x0   [ x]Lethargic  [ ]Cachexia  [ ]Unarousable  [ ]Verbal  [X ]Non-Verbal  Behavioral:   [ ] Anxiety  [ ] Delirium [ ] Agitation [ ] Other  HEENT:  [ ]Normal   [ x]Dry mouth   [ ]ET Tube/Trach  [ ]Oral lesions  PULMONARY:   [ ]Clear [ ]Tachypnea  [ ]Audible excessive secretions   [ ]Rhonchi        [ ]Right [ ]Left [ ]Bilateral  [x ]Crackles        [ ]Right [ ]Left [ ]Bilateral  [ ]Wheezing     [ ]Right [ ]Left [ ]Bilateral  CARDIOVASCULAR:    [x ]Regular [ ]Irregular [ ]Tachy  [ ]Amado [ ]Murmur [ ]Other  GASTROINTESTINAL:  [x ]Soft  [ ]Distended   [ x]+BS  [ x]Non tender [ ]Tender  [ ]PEG [ ]OGT/ NGT   GENITOURINARY:  [ ]Normal [x ] Incontinent   [ ]Oliguria/Anuria   [ ]Guadalupe  MUSCULOSKELETAL:   [ ]Normal   [x ]Weakness  [ ]Bed/Wheelchair bound [ ]Edema  NEUROLOGIC:   [ ]No focal deficits  [x ] Cognitive impairment  [ ] Dysphagia [ ]Dysarthria [ ] Paresis [ ]Other   SKIN: ecchymoses  [ ]Normal   [ ]Pressure ulcer(s)  [ ]Rash    CRITICAL CARE:  [ ] Shock Present  [ ]Septic [ ]Cardiogenic [ ]Neurologic [ ]Hypovolemic  [ ]  Vasopressors [ ]  Inotropes   [ ] Respiratory failure present  [ ] Acute  [ ] Chronic [ ] Hypoxic  [ ] Hypercarbic [ ] Other  [ ] Other organ failure     LABS: no new lab    RADIOLOGY & ADDITIONAL STUDIES:< from: CT Chest No Cont (04.01.19 @ 16:58) >    EXAM:  CT CHEST                            PROCEDURE DATE:  04/01/2019            INTERPRETATION:  CLINICAL INFORMATION: Pleural effusion. Evaluate for   pneumonia.    COMPARISON: CTA chest 5/20/2016.    PROCEDURE:   CT of the Chest was performed without intravenous contrast.  Sagittal and coronal reformats were performed.      FINDINGS:    CHEST:     LUNGS AND LARGE AIRWAYS/PLEURA: Patent central airways.  Large left   pleural effusion with associated passive atelectasis of the left upper   and lower lobes. Hyperdense material layering within the left pleural   space suggest hemothorax versus pleural-based disease.      VESSELS: Atherosclerotic disease.    HEART: Heart size is normal. Trace pericardial effusion.    MEDIASTINUM AND JAH: Nolymphadenopathy.    CHEST WALL AND LOWER NECK: Within normal limits.    VISUALIZED UPPER ABDOMEN: Within normal limits.    BONES: Chronic left-sided rib fractures. Degenerative changes of spine.    IMPRESSION: Large left pleural effusion with associated atelectasis of   the left upper and lower lobes.    Hyperdense material within the left pleural space suggesting hemothorax   or pleural thickening.    Findings discussed by Dr. Velasco with Dr. Espana on 4/1/2019 at 5:25 PM   with read back.       CHELSEA VELASCO M.D., RADIOLOGY RESIDENT  This document has been electronically signed.  KAJAL MOREJON M.D., ATTENDING RADIOLOGIST  This document has been electronically signed. Apr 1 2019  5:29PM      PROTEIN CALORIE MALNUTRITION:   [ ] PPSV2 < or = 30% [ ] significant weight loss [ ] poor nutritional intake [ ] anasarca [ ] catabolic state   Albumin, Serum: 2.3 g/dL (04-03-19 @ 06:48)   Artificial Nutrition [ ]     REFERRALS:   [ ]Chaplaincy  [ ] Hospice  [ ]Child Life  [ ]Social Work  [ ]Case management [ ]Holistic Therapy [ ] Physical Therapy [ ] Dietary   Goals of Care Document: Goals of Care Conversation - Personal Advance Directive [LYNDA Quispe] (04-05-19 @ 15:13) GAP TEAM PALLIATIVE CARE UNIT PROGRESS NOTE:     [  ] Patient on hospice program.    INDICATION FOR PALLIATIVE CARE UNIT SERVICES: pain, dyspnea.     INTERVAL HPI/OVERNIGHT EVENTS: Patient transferred to PCU. Patient received 4 doses of morphine 0.5 mg IV since yesterday. Currently in bed, denies pain. I met with daughter at bedside. She does not have oral route. Will stop ASA and Digoxin as per family request. Patient is awake but is not able to talk.       DNR on chart: Yes    Allergies    penicillin G benzathine (Unknown)    Intolerances    MEDICATIONS  (STANDING):    MEDICATIONS  (PRN):  acetaminophen  Suppository .. 650 milliGRAM(s) Rectal every 6 hours PRN Temp greater or equal to 38C (100.4F)  LORazepam   Injectable 0.5 milliGRAM(s) IV Push every 2 hours PRN anxiety/agitation  morphine  - Injectable 0.5 milliGRAM(s) IV Push every 2 hours PRN shortness of breath  morphine  - Injectable 0.5 milliGRAM(s) IV Push every 2 hours PRN pain/discomfort    ITEMS UNCHECKED ARE NOT PRESENT    PRESENT SYMPTOMS: [ ]Unable to obtain due to poor mentation   Source if other than patient:  [ ]Family   [ ]Team     Pain (Impact on QOL):  denies pain   Location:       Minimal acceptable level (0-10 scale):                    Aggrevating factors:  Quality:  Radiation:  Severity (0-10 scale):     Dyspnea:                           [ ]Mild [X ]Moderate [ ]Severe  Anxiety:                             [ ]Mild [ ]Moderate [ ]Severe  Fatigue:                             [ ]Mild [ ]Moderate [ ]Severe  Nausea:                             [ ]Mild [ ]Moderate [ ]Severe  Loss of appetite:              [ ]Mild [X ]Moderate [ ]Severe  Constipation:                    [ ]Mild [ ]Moderate [ ]Severe    PAINAD Score:    [email or web address] (Ctrl +  left click to view)  		  Other Symptoms:  [ ]All other review of systems negative     Karnofsky Performance Score/Palliative Performance Status Version 2:       20-30  %  PHYSICAL EXAM:  Vital Signs Last 24 Hrs  T(C): 36.5 (06 Apr 2019 08:49), Max: 36.5 (06 Apr 2019 08:49)  T(F): 97.7 (06 Apr 2019 08:49), Max: 97.7 (06 Apr 2019 08:49)  HR: 82 (06 Apr 2019 08:49) (76 - 82)  BP: 95/53 (06 Apr 2019 08:49) (95/53 - 120/76)  BP(mean): --  RR: 18 (06 Apr 2019 08:49) (18 - 18)  SpO2: 99% (06 Apr 2019 08:49) (97% - 99%) I&O's Summary    05 Apr 2019 07:01  -  06 Apr 2019 07:00  --------------------------------------------------------  IN: 60 mL / OUT: 300 mL / NET: -240 mL    GENERAL:  [ ]Alert  [x ]Oriented x0   [ x]Lethargic  [ ]Cachexia  [ ]Unarousable  [ ]Verbal  [X ]Non-Verbal  Behavioral:   [ ] Anxiety  [ ] Delirium [ ] Agitation [ ] Other  HEENT:  [ ]Normal   [ x]Dry mouth   [ ]ET Tube/Trach  [ ]Oral lesions  PULMONARY:   [ ]Clear [ ]Tachypnea  [ ]Audible excessive secretions   [ ]Rhonchi        [ ]Right [ ]Left [ ]Bilateral  [x ]Crackles        [ ]Right [ ]Left [ ]Bilateral  [ ]Wheezing     [ ]Right [ ]Left [ ]Bilateral  CARDIOVASCULAR:    [x ]Regular [ ]Irregular [ ]Tachy  [ ]Amado [ ]Murmur [ ]Other  GASTROINTESTINAL:  [x ]Soft  [ ]Distended   [ x]+BS  [ x]Non tender [ ]Tender  [ ]PEG [ ]OGT/ NGT   GENITOURINARY:  [ ]Normal [x ] Incontinent   [ ]Oliguria/Anuria   [ ]Guadalupe  MUSCULOSKELETAL:   [ ]Normal   [x ]Weakness  [ ]Bed/Wheelchair bound [ ]Edema  NEUROLOGIC:   [ ]No focal deficits  [x ] Cognitive impairment  [ ] Dysphagia [ ]Dysarthria [ ] Paresis [ ]Other   SKIN: ecchymoses  [ ]Normal   [ ]Pressure ulcer(s)  [ ]Rash    CRITICAL CARE:  [ ] Shock Present  [ ]Septic [ ]Cardiogenic [ ]Neurologic [ ]Hypovolemic  [ ]  Vasopressors [ ]  Inotropes   [ ] Respiratory failure present  [ ] Acute  [ ] Chronic [ ] Hypoxic  [ ] Hypercarbic [ ] Other  [ ] Other organ failure     LABS: no new lab    RADIOLOGY & ADDITIONAL STUDIES:< from: CT Chest No Cont (04.01.19 @ 16:58) >    EXAM:  CT CHEST                            PROCEDURE DATE:  04/01/2019            INTERPRETATION:  CLINICAL INFORMATION: Pleural effusion. Evaluate for   pneumonia.    COMPARISON: CTA chest 5/20/2016.    PROCEDURE:   CT of the Chest was performed without intravenous contrast.  Sagittal and coronal reformats were performed.      FINDINGS:    CHEST:     LUNGS AND LARGE AIRWAYS/PLEURA: Patent central airways.  Large left   pleural effusion with associated passive atelectasis of the left upper   and lower lobes. Hyperdense material layering within the left pleural   space suggest hemothorax versus pleural-based disease.      VESSELS: Atherosclerotic disease.    HEART: Heart size is normal. Trace pericardial effusion.    MEDIASTINUM AND JAH: Nolymphadenopathy.    CHEST WALL AND LOWER NECK: Within normal limits.    VISUALIZED UPPER ABDOMEN: Within normal limits.    BONES: Chronic left-sided rib fractures. Degenerative changes of spine.    IMPRESSION: Large left pleural effusion with associated atelectasis of   the left upper and lower lobes.    Hyperdense material within the left pleural space suggesting hemothorax   or pleural thickening.    Findings discussed by Dr. Velasco with Dr. Espana on 4/1/2019 at 5:25 PM   with read back.       CHELSEA VELASCO M.D., RADIOLOGY RESIDENT  This document has been electronically signed.  KAJAL MOREJON M.D., ATTENDING RADIOLOGIST  This document has been electronically signed. Apr 1 2019  5:29PM      PROTEIN CALORIE MALNUTRITION:   [ ] PPSV2 < or = 30% [ ] significant weight loss [ ] poor nutritional intake [ ] anasarca [ ] catabolic state   Albumin, Serum: 2.3 g/dL (04-03-19 @ 06:48)   Artificial Nutrition [ ]     REFERRALS:   [ ]Chaplaincy  [ ] Hospice  [ ]Child Life  [ ]Social Work  [ ]Case management [ ]Holistic Therapy [ ] Physical Therapy [ ] Dietary   Goals of Care Document: Goals of Care Conversation - Personal Advance Directive [LYNDA Quispe] (04-05-19 @ 15:13)

## 2019-04-06 NOTE — PROGRESS NOTE ADULT - PROBLEM SELECTOR PLAN 6
In PCU now,  Comfort care only   Possible hospice dispo back to [facility] if symptoms stabilize In PCU now  Comfort care only   Possible hospice dispo back to [facility] if symptoms stabilize

## 2019-04-06 NOTE — PROGRESS NOTE ADULT - PROBLEM SELECTOR PLAN 5
MOLST in chart  DNR/DNI, no abx, no ivf, no artificial nutrition,   No digoxin, no ASA   do not send to hospital and comfort measures only.

## 2019-04-06 NOTE — PROGRESS NOTE ADULT - ASSESSMENT
89 yo female with dementia presenting with decreased activity and PO for 1 day. Lives in independent living home, was brought in for decreased communication, PO, and energy for 1 day. Patient found to have large pleural effusion s/p chest tube and subsequent removal as family does not want to pursue aggressive interventions. Palliative care consulted for GOC discussion. and patient is transferred to PCU for comfort care.

## 2019-04-06 NOTE — PROGRESS NOTE ADULT - PROBLEM SELECTOR PLAN 2
NO further aggressive diagnostic or therapeutic measure as per daughter No further aggressive diagnostic or therapeutic measure as per daughter

## 2019-04-06 NOTE — PROGRESS NOTE ADULT - PROBLEM SELECTOR PLAN 1
-c/w morphine 0.5mg IV q2h prn for sob/pain.  -required 4 doses since yesterday  -will f/u to see if she needs ATC -c/w morphine 0.5mg IV q2h prn for sob/pain.  -required 4 doses since yesterday  -will f/u to see if she needs ATC, currently appears comfortable

## 2019-04-07 PROCEDURE — 99233 SBSQ HOSP IP/OBS HIGH 50: CPT

## 2019-04-07 RX ORDER — MORPHINE SULFATE 50 MG/1
0.5 CAPSULE, EXTENDED RELEASE ORAL
Qty: 0 | Refills: 0 | Status: DISCONTINUED | OUTPATIENT
Start: 2019-04-07 | End: 2019-04-11

## 2019-04-07 RX ORDER — MORPHINE SULFATE 50 MG/1
0.5 CAPSULE, EXTENDED RELEASE ORAL EVERY 6 HOURS
Qty: 0 | Refills: 0 | Status: DISCONTINUED | OUTPATIENT
Start: 2019-04-07 | End: 2019-04-11

## 2019-04-07 RX ADMIN — MORPHINE SULFATE 0.5 MILLIGRAM(S): 50 CAPSULE, EXTENDED RELEASE ORAL at 23:08

## 2019-04-07 RX ADMIN — MORPHINE SULFATE 0.5 MILLIGRAM(S): 50 CAPSULE, EXTENDED RELEASE ORAL at 11:09

## 2019-04-07 RX ADMIN — Medication 0.5 MILLIGRAM(S): at 08:36

## 2019-04-07 RX ADMIN — MORPHINE SULFATE 0.5 MILLIGRAM(S): 50 CAPSULE, EXTENDED RELEASE ORAL at 00:37

## 2019-04-07 RX ADMIN — MORPHINE SULFATE 0.5 MILLIGRAM(S): 50 CAPSULE, EXTENDED RELEASE ORAL at 17:17

## 2019-04-07 RX ADMIN — Medication 0.5 MILLIGRAM(S): at 01:32

## 2019-04-07 RX ADMIN — Medication 0.5 MILLIGRAM(S): at 13:16

## 2019-04-07 NOTE — PROGRESS NOTE ADULT - PROBLEM SELECTOR PLAN 1
-c/w morphine 0.5mg IV q1h prn for sob/pain.  -required 3 doses since yesterday  -start Morphine 0.5mg IV Q6 PRN

## 2019-04-07 NOTE — PROGRESS NOTE ADULT - ASSESSMENT
89 yo female with dementia presenting with decreased activity and PO for 1 day. Lives in independent living home, was brought in for decreased communication, PO, and energy for 1 day. Patient found to have large pleural effusion s/p chest tube and subsequent removal as family does not want to pursue aggressive interventions. Palliative care consulted for GOC discussion. and patient is transferred to PCU for comfort care.

## 2019-04-07 NOTE — PROGRESS NOTE ADULT - SUBJECTIVE AND OBJECTIVE BOX
GAP TEAM PALLIATIVE CARE UNIT PROGRESS NOTE:     [  ] Patient on hospice program.    INDICATION FOR PALLIATIVE CARE UNIT SERVICES: pain, dyspnea.     INTERVAL HPI/OVERNIGHT EVENTS: Patient transferred to PCU. Patient received 3 doses of morphine 0.5 mg IV  and two doses of Ativan 0.5 mg IV PRN since yesterday. She is more agitated this morning. She does not have oral route.     DNR on chart: Yes    Allergies    penicillin G benzathine (Unknown)    Intolerances    MEDICATIONS  (STANDING):  morphine  - Injectable 0.5 milliGRAM(s) IV Push every 6 hours    MEDICATIONS  (PRN):  acetaminophen  Suppository .. 650 milliGRAM(s) Rectal every 6 hours PRN Temp greater or equal to 38C (100.4F)  bisacodyl Suppository 10 milliGRAM(s) Rectal daily PRN Constipation  LORazepam   Injectable 0.5 milliGRAM(s) IV Push every 2 hours PRN anxiety/agitation  morphine  - Injectable 0.5 milliGRAM(s) IV Push every 1 hour PRN shortness of breath  morphine  - Injectable 0.5 milliGRAM(s) IV Push every 1 hour PRN pain/discomfort      ITEMS UNCHECKED ARE NOT PRESENT    PRESENT SYMPTOMS: [ ]Unable to obtain due to poor mentation   Source if other than patient:  [ ]Family   [ ]Team     Pain (Impact on QOL):  denies pain   Location:       Minimal acceptable level (0-10 scale):                    Aggrevating factors:  Quality:  Radiation:  Severity (0-10 scale):     Dyspnea:                           [ ]Mild [X ]Moderate [ ]Severe  Anxiety:                             [ ]Mild [X ]Moderate [ ]Severe  Fatigue:                             [ ]Mild [ ]Moderate [ ]Severe  Nausea:                             [ ]Mild [ ]Moderate [ ]Severe  Loss of appetite:              [ ]Mild [X ]Moderate [ ]Severe  Constipation:                    [ ]Mild [ ]Moderate [ ]Severe    PAINAD Score:    [email or web address] (Ctrl +  left click to view)  		  Other Symptoms:  [ ]All other review of systems negative     Karnofsky Performance Score/Palliative Performance Status Version 2:       20-30  %  PHYSICAL EXAM:  Vital Signs Last 24 Hrs  T(C): 36.5 (07 Apr 2019 09:07), Max: 36.5 (07 Apr 2019 09:07)  T(F): 97.7 (07 Apr 2019 09:07), Max: 97.7 (07 Apr 2019 09:07)  HR: 132 (07 Apr 2019 09:07) (132 - 132)  BP: 104/68 (07 Apr 2019 09:07) (104/68 - 104/68)  BP(mean): --  RR: 18 (07 Apr 2019 09:07) (18 - 18)  SpO2: 95% (07 Apr 2019 09:07) (95% - 95%)    GENERAL:  [ ]Alert  [x ]Oriented x0   [ x]Lethargic  [ ]Cachexia  [ ]Unarousable  [ ]Verbal  [X ]Non-Verbal  Behavioral:   [ ] Anxiety  [ ] Delirium [X ] Agitation [ ] Other  HEENT:  [ ]Normal   [ x]Dry mouth   [ ]ET Tube/Trach  [ ]Oral lesions  PULMONARY:   [ ]Clear [ ]Tachypnea  [ ]Audible excessive secretions   [ ]Rhonchi        [ ]Right [ ]Left [ ]Bilateral  [x ]Crackles        [ ]Right [ ]Left [ ]Bilateral  [ ]Wheezing     [ ]Right [ ]Left [ ]Bilateral  CARDIOVASCULAR:    [x ]Regular [ ]Irregular [ ]Tachy  [ ]Amado [ ]Murmur [ ]Other  GASTROINTESTINAL:  [x ]Soft  [ ]Distended   [ x]+BS  [ x]Non tender [ ]Tender  [ ]PEG [ ]OGT/ NGT   GENITOURINARY:  [ ]Normal [x ] Incontinent   [ ]Oliguria/Anuria   [ ]Guadalupe  MUSCULOSKELETAL:   [ ]Normal   [x ]Weakness  [ ]Bed/Wheelchair bound [ ]Edema  NEUROLOGIC:   [ ]No focal deficits  [x ] Cognitive impairment  [ ] Dysphagia [ ]Dysarthria [ ] Paresis [ ]Other   SKIN: ecchymoses  [ ]Normal   [ ]Pressure ulcer(s)  [X ]Rash    CRITICAL CARE:  [ ] Shock Present  [ ]Septic [ ]Cardiogenic [ ]Neurologic [ ]Hypovolemic  [ ]  Vasopressors [ ]  Inotropes   [ ] Respiratory failure present  [ ] Acute  [ ] Chronic [ ] Hypoxic  [ ] Hypercarbic [ ] Other  [ ] Other organ failure     LABS: no new lab    RADIOLOGY & ADDITIONAL STUDIES:< from: CT Chest No Cont (04.01.19 @ 16:58) >    EXAM:  CT CHEST                            PROCEDURE DATE:  04/01/2019            INTERPRETATION:  CLINICAL INFORMATION: Pleural effusion. Evaluate for   pneumonia.    COMPARISON: CTA chest 5/20/2016.    PROCEDURE:   CT of the Chest was performed without intravenous contrast.  Sagittal and coronal reformats were performed.      FINDINGS:    CHEST:     LUNGS AND LARGE AIRWAYS/PLEURA: Patent central airways.  Large left   pleural effusion with associated passive atelectasis of the left upper   and lower lobes. Hyperdense material layering within the left pleural   space suggest hemothorax versus pleural-based disease.      VESSELS: Atherosclerotic disease.    HEART: Heart size is normal. Trace pericardial effusion.    MEDIASTINUM AND JAH: Nolymphadenopathy.    CHEST WALL AND LOWER NECK: Within normal limits.    VISUALIZED UPPER ABDOMEN: Within normal limits.    BONES: Chronic left-sided rib fractures. Degenerative changes of spine.    IMPRESSION: Large left pleural effusion with associated atelectasis of   the left upper and lower lobes.    Hyperdense material within the left pleural space suggesting hemothorax   or pleural thickening.    Findings discussed by Dr. Velasco with Dr. Espana on 4/1/2019 at 5:25 PM   with read back.       CHELSEA VELASCO M.D., RADIOLOGY RESIDENT  This document has been electronically signed.  KAJAL MOREJON M.D., ATTENDING RADIOLOGIST  This document has been electronically signed. Apr 1 2019  5:29PM      PROTEIN CALORIE MALNUTRITION:   [ ] PPSV2 < or = 30% [ ] significant weight loss [ ] poor nutritional intake [ ] anasarca [ ] catabolic state   Albumin, Serum: 2.3 g/dL (04-03-19 @ 06:48)   Artificial Nutrition [ ]     REFERRALS:   [ ]Chaplaincy  [ ] Hospice  [ ]Child Life  [ ]Social Work  [ ]Case management [ ]Holistic Therapy [ ] Physical Therapy [ ] Dietary   Goals of Care Document: Goals of Care Conversation - Personal Advance Directive [LYNDA Quispe] (04-05-19 @ 15:13)

## 2019-04-08 DIAGNOSIS — K59.00 CONSTIPATION, UNSPECIFIED: ICD-10-CM

## 2019-04-08 DIAGNOSIS — R45.1 RESTLESSNESS AND AGITATION: ICD-10-CM

## 2019-04-08 PROCEDURE — 99233 SBSQ HOSP IP/OBS HIGH 50: CPT | Mod: GC

## 2019-04-08 RX ADMIN — Medication 10 MILLIGRAM(S): at 16:46

## 2019-04-08 RX ADMIN — MORPHINE SULFATE 0.5 MILLIGRAM(S): 50 CAPSULE, EXTENDED RELEASE ORAL at 18:34

## 2019-04-08 RX ADMIN — Medication 0.5 MILLIGRAM(S): at 18:32

## 2019-04-08 RX ADMIN — Medication 0.5 MILLIGRAM(S): at 13:21

## 2019-04-08 RX ADMIN — MORPHINE SULFATE 0.5 MILLIGRAM(S): 50 CAPSULE, EXTENDED RELEASE ORAL at 17:15

## 2019-04-08 RX ADMIN — MORPHINE SULFATE 0.5 MILLIGRAM(S): 50 CAPSULE, EXTENDED RELEASE ORAL at 11:54

## 2019-04-08 RX ADMIN — MORPHINE SULFATE 0.5 MILLIGRAM(S): 50 CAPSULE, EXTENDED RELEASE ORAL at 05:02

## 2019-04-08 NOTE — PROGRESS NOTE ADULT - PROBLEM SELECTOR PLAN 1
-Morphine 0.5mg IV Q6 PRN  -c/w morphine 0.5mg IV q1h prn for sob/pain.  - Appears to be well controlled with ATC medication.

## 2019-04-08 NOTE — DIETITIAN INITIAL EVALUATION ADULT. - OTHER INFO
Per discussion with palliative NP pt is not appropriate for nutrition assessment at this time, per team "Patient transferred to PCU for management of symptoms. Significant decline in mental status. Family requesting full comfort approach. Symptoms being managed. No further work-up or aggressive interventions" noted possible plan for transfer to inpatient hospice facility, RD to remain available as needed.

## 2019-04-08 NOTE — PROGRESS NOTE ADULT - PROBLEM SELECTOR PLAN 4
May be terminal agitation or secondary to dementia. Lorazepam being used prn with good effect. Will continue to monitor.

## 2019-04-08 NOTE — PROGRESS NOTE ADULT - ASSESSMENT
89 yo female with dementia presenting with decreased activity and PO for 1 day. Lives in independent living home, was brought in for decreased communication, PO, and energy for 1 day prior to hospital admission. Patient found to have large pleural effusion s/p chest tube and subsequent removal as family does not want to pursue aggressive interventions. Palliative care consulted for GOC discussion and patient was transferred to PCU for comfort care.     Patient with increased agitation overnight and given Lorazepam x 2 prn. Morphine being used ATC. Will monitor effectiveness. Referral made to [facility] for possible inpatient hospice services. Patient with minimal responsiveness and periods of intermittent agitation.

## 2019-04-08 NOTE — PROGRESS NOTE ADULT - PROBLEM SELECTOR PLAN 2
No further aggressive diagnostic or therapeutic measure as per daughter. Manage and monitor symptoms.

## 2019-04-08 NOTE — PROGRESS NOTE ADULT - SUBJECTIVE AND OBJECTIVE BOX
GAP TEAM PALLIATIVE CARE UNIT PROGRESS NOTE:     [  ] Patient on hospice program.    INDICATION FOR PALLIATIVE CARE UNIT SERVICES: pain, dyspnea.     INTERVAL HPI/OVERNIGHT EVENTS: Patient received Lorazepam x 2 prn for agitation and receiving Morphine ATC. Patient minimally responsive with periods of agitation. Tachycardic which may be secondary to terminal agitation. Will continue to monitor effectiveness of Lorazepam and put ATC if needed. HCN to follow for possible transfer to inpatient hospice facility. Last BM 4/319. Plan for Dulcolax to be given today.     DNR on chart: Yes    Allergies    penicillin G benzathine (Unknown)    Intolerances    MEDICATIONS  (STANDING):  morphine  - Injectable 0.5 milliGRAM(s) IV Push every 6 hours    MEDICATIONS  (PRN):  acetaminophen  Suppository .. 650 milliGRAM(s) Rectal every 6 hours PRN Temp greater or equal to 38C (100.4F)  bisacodyl Suppository 10 milliGRAM(s) Rectal daily PRN Constipation  LORazepam   Injectable 0.5 milliGRAM(s) IV Push every 2 hours PRN anxiety/agitation  morphine  - Injectable 0.5 milliGRAM(s) IV Push every 1 hour PRN shortness of breath  morphine  - Injectable 0.5 milliGRAM(s) IV Push every 1 hour PRN pain/discomfort      ITEMS UNCHECKED ARE NOT PRESENT    PRESENT SYMPTOMS: [ ]Unable to obtain due to poor mentation   Source if other than patient:  [ ]Family   [ ]Team     Pain (Impact on QOL):  denies pain   Location:       Minimal acceptable level (0-10 scale):                    Aggrevating factors:  Quality:  Radiation:  Severity (0-10 scale):     Dyspnea:                           [ ]Mild [X ]Moderate [ ]Severe  Anxiety:                             [ ]Mild [X ]Moderate [ ]Severe  Fatigue:                             [ ]Mild [ ]Moderate [ ]Severe  Nausea:                             [ ]Mild [ ]Moderate [ ]Severe  Loss of appetite:              [ ]Mild [X ]Moderate [ ]Severe  Constipation:                    [ ]Mild [ ]Moderate [ ]Severe    PAINAD Score:    [email or web address]/painad.[email or web address] (Ctrl +  left click to view)  		  Other Symptoms:  [ ]All other review of systems negative     Karnofsky Performance Score/Palliative Performance Status Version 2:       20 %  PHYSICAL EXAM:  Vital Signs Last 24 Hrs  T(C): 36.8 (08 Apr 2019 08:11), Max: 36.8 (08 Apr 2019 08:11)  T(F): 98.2 (08 Apr 2019 08:11), Max: 98.2 (08 Apr 2019 08:11)  HR: 142 (08 Apr 2019 08:11) (142 - 142)  BP: 120/70 (08 Apr 2019 08:11) (120/70 - 120/70)  BP(mean): --  RR: 20 (08 Apr 2019 08:11) (20 - 20)  SpO2: 97% (08 Apr 2019 08:11) (97% - 97%)    GENERAL:  [ ]Alert  [ ]Oriented   [ x]Lethargic  [ ]Cachexia  [ ]Unarousable  [ ]Verbal  [X ]Non-Verbal  Behavioral:   [ ] Anxiety  [ ] Delirium [X ] Agitation [ ] Other  HEENT:  [ ]Normal   [ x]Dry mouth   [ ]ET Tube/Trach  [ ]Oral lesions  PULMONARY:   [ ]Clear [ ]Tachypnea  [ ]Audible excessive secretions   [ ]Rhonchi        [ ]Right [ ]Left [ ]Bilateral  [x ]Crackles        [ ]Right [ ]Left [ ]Bilateral  [ ]Wheezing     [ ]Right [ ]Left [ ]Bilateral  CARDIOVASCULAR:    [x ]Regular [ ]Irregular [ ]Tachy  [ ]Amado [ ]Murmur [ ]Other  GASTROINTESTINAL:  [x ]Soft  [ ]Distended   [ x]+BS  [ x]Non tender [ ]Tender  [ ]PEG [ ]OGT/ NGT Last BM: 4/3/19  GENITOURINARY:  [ ]Normal [x ] Incontinent   [ ]Oliguria/Anuria   [ ]Guadalupe  MUSCULOSKELETAL:   [ ]Normal   [x ]Weakness  [ ]Bed/Wheelchair bound [ ]Edema  NEUROLOGIC:   [ ]No focal deficits  [x ] Cognitive impairment  [ ] Dysphagia [ ]Dysarthria [ ] Paresis [ ]Other   SKIN: ecchymoses  [ ]Normal   [ ]Pressure ulcer(s)  [X ]Rash    CRITICAL CARE:  [ ] Shock Present  [ ]Septic [ ]Cardiogenic [ ]Neurologic [ ]Hypovolemic  [ ]  Vasopressors [ ]  Inotropes   [ ] Respiratory failure present  [ ] Acute  [ ] Chronic [ ] Hypoxic  [ ] Hypercarbic [ ] Other  [ ] Other organ failure     LABS: no new lab    RADIOLOGY & ADDITIONAL STUDIES:< from: CT Chest No Cont (04.01.19 @ 16:58) >    EXAM:  CT CHEST                            PROCEDURE DATE:  04/01/2019            INTERPRETATION:  CLINICAL INFORMATION: Pleural effusion. Evaluate for   pneumonia.    COMPARISON: CTA chest 5/20/2016.    PROCEDURE:   CT of the Chest was performed without intravenous contrast.  Sagittal and coronal reformats were performed.      FINDINGS:    CHEST:     LUNGS AND LARGE AIRWAYS/PLEURA: Patent central airways.  Large left   pleural effusion with associated passive atelectasis of the left upper   and lower lobes. Hyperdense material layering within the left pleural   space suggest hemothorax versus pleural-based disease.      VESSELS: Atherosclerotic disease.    HEART: Heart size is normal. Trace pericardial effusion.    MEDIASTINUM AND JAH: Nolymphadenopathy.    CHEST WALL AND LOWER NECK: Within normal limits.    VISUALIZED UPPER ABDOMEN: Within normal limits.    BONES: Chronic left-sided rib fractures. Degenerative changes of spine.    IMPRESSION: Large left pleural effusion with associated atelectasis of   the left upper and lower lobes.    Hyperdense material within the left pleural space suggesting hemothorax   or pleural thickening.    Findings discussed by Dr. Velasco with Dr. Espana on 4/1/2019 at 5:25 PM   with read back.       CHELSEA VELASCO M.D., RADIOLOGY RESIDENT  This document has been electronically signed.  KAJAL MOREJON M.D., ATTENDING RADIOLOGIST  This document has been electronically signed. Apr 1 2019  5:29PM      PROTEIN CALORIE MALNUTRITION:   [x ] PPSV2 < or = 30% [ ] significant weight loss [ ] poor nutritional intake [ ] anasarca [ ] catabolic state   Albumin, Serum: 2.3 g/dL (04-03-19 @ 06:48)   Artificial Nutrition [ ]     REFERRALS:   [ ]Chaplaincy  [x ] Hospice  [ ]Child Life  [ ]Social Work  [ ]Case management [ ]Holistic Therapy [ ] Physical Therapy [ ] Dietary   Goals of Care Document: Goals of Care Conversation - Personal Advance Directive [LYNDA Quispe] (04-05-19 @ 15:13)

## 2019-04-08 NOTE — PROGRESS NOTE ADULT - PROBLEM SELECTOR PLAN 7
Patient transferred to PCU for management of symptoms. Significant decline in mental status. Family requesting full comfort approach. Symptoms being managed. No further work-up or aggressive interventions. MOLST present in chart and reflective of wishes for patient.

## 2019-04-08 NOTE — PROGRESS NOTE ADULT - PROBLEM SELECTOR PLAN 5
Last BM 4/3/19. May be contributing to agitation. Plan for Dulcolax suppository today. Will monitor effectiveness.

## 2019-04-09 PROCEDURE — 99233 SBSQ HOSP IP/OBS HIGH 50: CPT | Mod: GC

## 2019-04-09 RX ADMIN — MORPHINE SULFATE 0.5 MILLIGRAM(S): 50 CAPSULE, EXTENDED RELEASE ORAL at 15:42

## 2019-04-09 RX ADMIN — Medication 0.5 MILLIGRAM(S): at 12:11

## 2019-04-09 RX ADMIN — MORPHINE SULFATE 0.5 MILLIGRAM(S): 50 CAPSULE, EXTENDED RELEASE ORAL at 11:46

## 2019-04-09 RX ADMIN — MORPHINE SULFATE 0.5 MILLIGRAM(S): 50 CAPSULE, EXTENDED RELEASE ORAL at 05:34

## 2019-04-09 RX ADMIN — MORPHINE SULFATE 0.5 MILLIGRAM(S): 50 CAPSULE, EXTENDED RELEASE ORAL at 17:22

## 2019-04-09 RX ADMIN — MORPHINE SULFATE 0.5 MILLIGRAM(S): 50 CAPSULE, EXTENDED RELEASE ORAL at 12:01

## 2019-04-09 RX ADMIN — MORPHINE SULFATE 0.5 MILLIGRAM(S): 50 CAPSULE, EXTENDED RELEASE ORAL at 15:27

## 2019-04-09 RX ADMIN — MORPHINE SULFATE 0.5 MILLIGRAM(S): 50 CAPSULE, EXTENDED RELEASE ORAL at 01:26

## 2019-04-09 RX ADMIN — MORPHINE SULFATE 0.5 MILLIGRAM(S): 50 CAPSULE, EXTENDED RELEASE ORAL at 21:53

## 2019-04-09 RX ADMIN — Medication 0.5 MILLIGRAM(S): at 17:07

## 2019-04-09 NOTE — PROGRESS NOTE ADULT - PROBLEM SELECTOR PLAN 1
-Morphine 0.5mg IV Q6 PRN  -c/w morphine 0.5mg IV q1h prn for sob/pain.  - Appears to be well controlled with ATC medication. -Morphine 0.5mg IV Q6 PRN  -c/w morphine 0.5mg IV q1h prn for sob/pain.  -PRN used x 1 in past 24 hours.  - Appears to be well controlled with ATC medication.

## 2019-04-09 NOTE — PROGRESS NOTE ADULT - SUBJECTIVE AND OBJECTIVE BOX
GAP TEAM PALLIATIVE CARE UNIT PROGRESS NOTE:     [  ] Patient on hospice program.    INDICATION FOR PALLIATIVE CARE UNIT SERVICES: pain, dyspnea.     INTERVAL HPI/OVERNIGHT EVENTS: Patient remains on ATC Morphine. PRN Ativan used x 2 and Morphine x 1. Patient minimally responsive and unable to tolerate po. HCN aware and evaluating for possible inpatient transfer to hospice.    DNR on chart: Yes    Allergies    penicillin G benzathine (Unknown)    Intolerances    MEDICATIONS  (STANDING):  morphine  - Injectable 0.5 milliGRAM(s) IV Push every 6 hours    MEDICATIONS  (PRN):  acetaminophen  Suppository .. 650 milliGRAM(s) Rectal every 6 hours PRN Temp greater or equal to 38C (100.4F)  bisacodyl Suppository 10 milliGRAM(s) Rectal daily PRN Constipation  LORazepam   Injectable 0.5 milliGRAM(s) IV Push every 2 hours PRN anxiety/agitation  morphine  - Injectable 0.5 milliGRAM(s) IV Push every 1 hour PRN shortness of breath  morphine  - Injectable 0.5 milliGRAM(s) IV Push every 1 hour PRN pain/discomfort      ITEMS UNCHECKED ARE NOT PRESENT    PRESENT SYMPTOMS: [ ]Unable to obtain due to poor mentation   Source if other than patient:  [ ]Family   [ ]Team     Pain (Impact on QOL):  denies pain   Location:       Minimal acceptable level (0-10 scale):                    Aggrevating factors:  Quality:  Radiation:  Severity (0-10 scale):     Dyspnea:                           [ ]Mild [X ]Moderate [ ]Severe  Anxiety:                             [ ]Mild [X ]Moderate [ ]Severe  Fatigue:                             [ ]Mild [ ]Moderate [ ]Severe  Nausea:                             [ ]Mild [ ]Moderate [ ]Severe  Loss of appetite:              [ ]Mild [X ]Moderate [ ]Severe  Constipation:                    [ ]Mild [ ]Moderate [ ]Severe    PAINAD Score:    [email or web address] (Ctrl +  left click to view)  		  Other Symptoms:  [ ]All other review of systems negative     Karnofsky Performance Score/Palliative Performance Status Version 2:       20 %  PHYSICAL EXAM:  Vital Signs Last 24 Hrs  T(C): 36.6 (09 Apr 2019 08:21), Max: 36.8 (08 Apr 2019 20:01)  T(F): 97.9 (09 Apr 2019 08:21), Max: 98.2 (08 Apr 2019 20:01)  HR: 78 (09 Apr 2019 08:21) (78 - 78)  BP: 138/76 (09 Apr 2019 08:21) (138/76 - 138/76)  BP(mean): --  RR: 18 (09 Apr 2019 08:21) (18 - 18)  SpO2: 96% (09 Apr 2019 08:21) (96% - 96%)    GENERAL:  [ ]Alert  [ ]Oriented   [ x]Lethargic  [ ]Cachexia  [ ]Unarousable  [ ]Verbal  [X ]Non-Verbal  Behavioral:   [ ] Anxiety  [ ] Delirium [X ] Agitation [ ] Other  HEENT:  [ ]Normal   [ x]Dry mouth   [ ]ET Tube/Trach  [ ]Oral lesions  PULMONARY:   [x ]Clear [ ]Tachypnea  [ ]Audible excessive secretions   [ ]Rhonchi        [ ]Right [ ]Left [ ]Bilateral  [ ]Crackles        [ ]Right [ ]Left [ ]Bilateral  [ ]Wheezing     [ ]Right [ ]Left [ ]Bilateral  CARDIOVASCULAR:    [x ]Regular [ ]Irregular [ ]Tachy  [ ]Amado [ ]Murmur [ ]Other  GASTROINTESTINAL:  [x ]Soft  [ ]Distended   [ x]+BS  [ x]Non tender [ ]Tender  [ ]PEG [ ]OGT/ NGT Last BM: 4/9/19 (s/p suppository)  GENITOURINARY:  [ ]Normal [x ] Incontinent   [ ]Oliguria/Anuria   [ ]Guadalupe  MUSCULOSKELETAL:   [ ]Normal   [x ]Weakness  [ ]Bed/Wheelchair bound [ ]Edema  NEUROLOGIC:   [ ]No focal deficits  [x ] Cognitive impairment  [ ] Dysphagia [ ]Dysarthria [ ] Paresis [ ]Other   SKIN: ecchymoses  [ ]Normal   [ ]Pressure ulcer(s)  [X ]Rash    CRITICAL CARE:  [ ] Shock Present  [ ]Septic [ ]Cardiogenic [ ]Neurologic [ ]Hypovolemic  [ ]  Vasopressors [ ]  Inotropes   [ ] Respiratory failure present  [ ] Acute  [ ] Chronic [ ] Hypoxic  [ ] Hypercarbic [ ] Other  [ ] Other organ failure     LABS: no new lab    RADIOLOGY & ADDITIONAL STUDIES:< from: CT Chest No Cont (04.01.19 @ 16:58) >    EXAM:  CT CHEST                            PROCEDURE DATE:  04/01/2019            INTERPRETATION:  CLINICAL INFORMATION: Pleural effusion. Evaluate for   pneumonia.    COMPARISON: CTA chest 5/20/2016.    PROCEDURE:   CT of the Chest was performed without intravenous contrast.  Sagittal and coronal reformats were performed.      FINDINGS:    CHEST:     LUNGS AND LARGE AIRWAYS/PLEURA: Patent central airways.  Large left   pleural effusion with associated passive atelectasis of the left upper   and lower lobes. Hyperdense material layering within the left pleural   space suggest hemothorax versus pleural-based disease.      VESSELS: Atherosclerotic disease.    HEART: Heart size is normal. Trace pericardial effusion.    MEDIASTINUM AND JAH: Nolymphadenopathy.    CHEST WALL AND LOWER NECK: Within normal limits.    VISUALIZED UPPER ABDOMEN: Within normal limits.    BONES: Chronic left-sided rib fractures. Degenerative changes of spine.    IMPRESSION: Large left pleural effusion with associated atelectasis of   the left upper and lower lobes.    Hyperdense material within the left pleural space suggesting hemothorax   or pleural thickening.    Findings discussed by Dr. Velasco with Dr. Espana on 4/1/2019 at 5:25 PM   with read back.       CHELSEA VELASCO M.D., RADIOLOGY RESIDENT  This document has been electronically signed.  KAJAL MOREJON M.D., ATTENDING RADIOLOGIST  This document has been electronically signed. Apr 1 2019  5:29PM      PROTEIN CALORIE MALNUTRITION:   [x ] PPSV2 < or = 30% [ ] significant weight loss [ ] poor nutritional intake [ ] anasarca [ ] catabolic state   Albumin, Serum: 2.3 g/dL (04-03-19 @ 06:48)   Artificial Nutrition [ ]     REFERRALS:   [ ]Chaplaincy  [x ] Hospice  [ ]Child Life  [ ]Social Work  [ ]Case management [ ]Holistic Therapy [ ] Physical Therapy [ ] Dietary   Goals of Care Document: Goals of Care Conversation - Personal Advance Directive [LYNDA Quispe] (04-05-19 @ 15:13)

## 2019-04-09 NOTE — PROGRESS NOTE ADULT - PROBLEM SELECTOR PLAN 7
Patient transferred to PCU for management of symptoms. Significant decline in mental status. Family requesting full comfort approach. Symptoms being managed. No further work-up or aggressive interventions. MOLST present in chart and reflective of wishes for patient. Patient transferred to PCU for management of symptoms. Significant decline in mental status. Family requesting full comfort approach. Symptoms being managed. No further work-up or aggressive interventions. MOLST present in chart and reflective of wishes for patient.    Working with HCN for possible transfer to inpatient facility.

## 2019-04-09 NOTE — PROGRESS NOTE ADULT - PROBLEM SELECTOR PLAN 5
Last BM 4/3/19. May be contributing to agitation. Plan for Dulcolax suppository today. Will monitor effectiveness. Dulcolax effective yesterday. Patient with BM this AM.

## 2019-04-09 NOTE — PROGRESS NOTE ADULT - ASSESSMENT
89 yo female with dementia presenting with decreased activity and PO for 1 day. Lives in independent living home, was brought in for decreased communication, PO, and energy for 1 day prior to hospital admission. Patient found to have large pleural effusion s/p chest tube and subsequent removal as family does not want to pursue aggressive interventions. Palliative care consulted for GOC discussion and patient was transferred to PCU for comfort care.     Lorazepam used x 2 prn and Morphine x 1. Patient 89 yo female with dementia presenting with decreased activity and PO for 1 day. Lives in independent living home, was brought in for decreased communication, PO, and energy for 1 day prior to hospital admission. Patient found to have large pleural effusion s/p chest tube and subsequent removal as family does not want to pursue aggressive interventions. Palliative care consulted for GOC discussion and patient was transferred to PCU for comfort care.     Lorazepam used x 2 prn and Morphine x 1. Patient appears more comfortable this AM. In touch with [facility] for possible inpatient transfer.

## 2019-04-10 PROCEDURE — 99233 SBSQ HOSP IP/OBS HIGH 50: CPT | Mod: GC

## 2019-04-10 RX ADMIN — Medication 0.5 MILLIGRAM(S): at 00:30

## 2019-04-10 RX ADMIN — MORPHINE SULFATE 0.5 MILLIGRAM(S): 50 CAPSULE, EXTENDED RELEASE ORAL at 00:30

## 2019-04-10 RX ADMIN — MORPHINE SULFATE 0.5 MILLIGRAM(S): 50 CAPSULE, EXTENDED RELEASE ORAL at 17:43

## 2019-04-10 RX ADMIN — MORPHINE SULFATE 0.5 MILLIGRAM(S): 50 CAPSULE, EXTENDED RELEASE ORAL at 12:06

## 2019-04-10 RX ADMIN — Medication 0.5 MILLIGRAM(S): at 21:45

## 2019-04-10 RX ADMIN — Medication 0.5 MILLIGRAM(S): at 17:43

## 2019-04-10 RX ADMIN — MORPHINE SULFATE 0.5 MILLIGRAM(S): 50 CAPSULE, EXTENDED RELEASE ORAL at 06:09

## 2019-04-10 RX ADMIN — Medication 0.5 MILLIGRAM(S): at 13:52

## 2019-04-10 NOTE — PROGRESS NOTE ADULT - PROBLEM SELECTOR PLAN 4
May be terminal agitation or secondary to dementia. Lorazepam being used prn with good effect. Will continue to monitor. Lorazepam used x 3 in past 24 hours.

## 2019-04-10 NOTE — PROGRESS NOTE ADULT - SUBJECTIVE AND OBJECTIVE BOX
GAP TEAM PALLIATIVE CARE UNIT PROGRESS NOTE:     [  ] Patient on hospice program.    INDICATION FOR PALLIATIVE CARE UNIT SERVICES: pain, dyspnea.     INTERVAL HPI/OVERNIGHT EVENTS: Patient remains on ATC Morphine. Lorazepam used x 3 in past 24 hours for agitation. Plan for transfer to inpatient hospice. HCN in touch with daughter for possible transfer.    DNR on chart: Yes    Allergies    penicillin G benzathine (Unknown)    Intolerances    MEDICATIONS  (STANDING):  morphine  - Injectable 0.5 milliGRAM(s) IV Push every 6 hours    MEDICATIONS  (PRN):  acetaminophen  Suppository .. 650 milliGRAM(s) Rectal every 6 hours PRN Temp greater or equal to 38C (100.4F)  bisacodyl Suppository 10 milliGRAM(s) Rectal daily PRN Constipation  LORazepam   Injectable 0.5 milliGRAM(s) IV Push every 2 hours PRN anxiety/agitation  morphine  - Injectable 0.5 milliGRAM(s) IV Push every 1 hour PRN shortness of breath  morphine  - Injectable 0.5 milliGRAM(s) IV Push every 1 hour PRN pain/discomfort    ITEMS UNCHECKED ARE NOT PRESENT    PRESENT SYMPTOMS: [ ]Unable to obtain due to poor mentation   Source if other than patient:  [ ]Family   [ ]Team     Pain (Impact on QOL):  denies pain   Location:       Minimal acceptable level (0-10 scale):                    Aggrevating factors:  Quality:  Radiation:  Severity (0-10 scale):     Dyspnea:                           [ ]Mild [X ]Moderate [ ]Severe  Anxiety:                             [ ]Mild [X ]Moderate [ ]Severe  Fatigue:                             [ ]Mild [ ]Moderate [ ]Severe  Nausea:                             [ ]Mild [ ]Moderate [ ]Severe  Loss of appetite:              [ ]Mild [X ]Moderate [ ]Severe  Constipation:                    [ ]Mild [ ]Moderate [ ]Severe    PAINAD Score:    [email or web address]/painad.pdf (Ctrl +  left click to view)  		  Other Symptoms:  [ ]All other review of systems negative     Karnofsky Performance Score/Palliative Performance Status Version 2:       20 %  PHYSICAL EXAM:  Vital Signs Last 24 Hrs  T(C): 36.6 (09 Apr 2019 08:21), Max: 36.8 (08 Apr 2019 20:01)  T(F): 97.9 (09 Apr 2019 08:21), Max: 98.2 (08 Apr 2019 20:01)  HR: 78 (09 Apr 2019 08:21) (78 - 78)  BP: 138/76 (09 Apr 2019 08:21) (138/76 - 138/76)  BP(mean): --  RR: 18 (09 Apr 2019 08:21) (18 - 18)  SpO2: 96% (09 Apr 2019 08:21) (96% - 96%)    GENERAL:  [ ]Alert  [ ]Oriented   [ x]Lethargic  [ ]Cachexia  [ ]Unarousable  [ ]Verbal  [X ]Non-Verbal  Behavioral:   [ ] Anxiety  [ ] Delirium [X ] Agitation [ ] Other  HEENT:  [ ]Normal   [ x]Dry mouth   [ ]ET Tube/Trach  [ ]Oral lesions  PULMONARY:   [x ]Clear [ ]Tachypnea  [ ]Audible excessive secretions   [ ]Rhonchi        [ ]Right [ ]Left [ ]Bilateral  [ ]Crackles        [ ]Right [ ]Left [ ]Bilateral  [ ]Wheezing     [ ]Right [ ]Left [ ]Bilateral  CARDIOVASCULAR:    [x ]Regular [ ]Irregular [ ]Tachy  [ ]Amado [ ]Murmur [ ]Other  GASTROINTESTINAL:  [x ]Soft  [ ]Distended   [ x]+BS  [ x]Non tender [ ]Tender  [ ]PEG [ ]OGT/ NGT Last BM: 4/9/19 (s/p suppository)  GENITOURINARY:  [ ]Normal [x ] Incontinent   [ ]Oliguria/Anuria   [ ]Guadalupe  MUSCULOSKELETAL:   [ ]Normal   [x ]Weakness  [ ]Bed/Wheelchair bound [ ]Edema  NEUROLOGIC:   [ ]No focal deficits  [x ] Cognitive impairment  [ ] Dysphagia [ ]Dysarthria [ ] Paresis [ ]Other   SKIN: ecchymoses  [ ]Normal   [ ]Pressure ulcer(s)  [X ]Rash    CRITICAL CARE:  [ ] Shock Present  [ ]Septic [ ]Cardiogenic [ ]Neurologic [ ]Hypovolemic  [ ]  Vasopressors [ ]  Inotropes   [ ] Respiratory failure present  [ ] Acute  [ ] Chronic [ ] Hypoxic  [ ] Hypercarbic [ ] Other  [ ] Other organ failure     LABS: no new lab    RADIOLOGY & ADDITIONAL STUDIES:< from: CT Chest No Cont (04.01.19 @ 16:58) >    EXAM:  CT CHEST                            PROCEDURE DATE:  04/01/2019            INTERPRETATION:  CLINICAL INFORMATION: Pleural effusion. Evaluate for   pneumonia.    COMPARISON: CTA chest 5/20/2016.    PROCEDURE:   CT of the Chest was performed without intravenous contrast.  Sagittal and coronal reformats were performed.      FINDINGS:    CHEST:     LUNGS AND LARGE AIRWAYS/PLEURA: Patent central airways.  Large left   pleural effusion with associated passive atelectasis of the left upper   and lower lobes. Hyperdense material layering within the left pleural   space suggest hemothorax versus pleural-based disease.      VESSELS: Atherosclerotic disease.    HEART: Heart size is normal. Trace pericardial effusion.    MEDIASTINUM AND JAH: Nolymphadenopathy.    CHEST WALL AND LOWER NECK: Within normal limits.    VISUALIZED UPPER ABDOMEN: Within normal limits.    BONES: Chronic left-sided rib fractures. Degenerative changes of spine.    IMPRESSION: Large left pleural effusion with associated atelectasis of   the left upper and lower lobes.    Hyperdense material within the left pleural space suggesting hemothorax   or pleural thickening.    Findings discussed by Dr. Velasco with Dr. Espana on 4/1/2019 at 5:25 PM   with read back.       CHELSEA VELASCO M.D., RADIOLOGY RESIDENT  This document has been electronically signed.  KAJAL MOREJON M.D., ATTENDING RADIOLOGIST  This document has been electronically signed. Apr 1 2019  5:29PM      PROTEIN CALORIE MALNUTRITION:   [x ] PPSV2 < or = 30% [ ] significant weight loss [ ] poor nutritional intake [ ] anasarca [ ] catabolic state   Albumin, Serum: 2.3 g/dL (04-03-19 @ 06:48)   Artificial Nutrition [ ]     REFERRALS:   [ ]Chaplaincy  [x ] Hospice  [ ]Child Life  [ ]Social Work  [ ]Case management [ ]Holistic Therapy [ ] Physical Therapy [ ] Dietary   Goals of Care Document: Goals of Care Conversation - Personal Advance Directive [LYNDA Quispe] (04-05-19 @ 15:13) GAP TEAM PALLIATIVE CARE UNIT PROGRESS NOTE:     [  ] Patient on hospice program.    INDICATION FOR PALLIATIVE CARE UNIT SERVICES: pain, dyspnea in the setting of end stage dementia with AFTT    INTERVAL HPI/OVERNIGHT EVENTS: Patient remains on ATC Morphine. Lorazepam used x 3 in past 24 hours for agitation. Plan for transfer to inpatient hospice. HCN in touch with daughter for possible transfer.    DNR on chart: Yes    Allergies    penicillin G benzathine (Unknown)    Intolerances    MEDICATIONS  (STANDING):  morphine  - Injectable 0.5 milliGRAM(s) IV Push every 6 hours    MEDICATIONS  (PRN):  acetaminophen  Suppository .. 650 milliGRAM(s) Rectal every 6 hours PRN Temp greater or equal to 38C (100.4F)  bisacodyl Suppository 10 milliGRAM(s) Rectal daily PRN Constipation  LORazepam   Injectable 0.5 milliGRAM(s) IV Push every 2 hours PRN anxiety/agitation  morphine  - Injectable 0.5 milliGRAM(s) IV Push every 1 hour PRN shortness of breath  morphine  - Injectable 0.5 milliGRAM(s) IV Push every 1 hour PRN pain/discomfort    ITEMS UNCHECKED ARE NOT PRESENT    PRESENT SYMPTOMS: [x ]Unable to obtain due to poor mentation   Source if other than patient:  [ ]Family   [ ]Team     Pain (Impact on QOL):  denies pain   Location:       Minimal acceptable level (0-10 scale):                    Aggrevating factors:  Quality:  Radiation:  Severity (0-10 scale):     Dyspnea:                           [ ]Mild [X ]Moderate [ ]Severe  Anxiety:                             [ ]Mild [X ]Moderate [ ]Severe  Fatigue:                             [ ]Mild [ ]Moderate [ ]Severe  Nausea:                             [ ]Mild [ ]Moderate [ ]Severe  Loss of appetite:              [ ]Mild [X ]Moderate [ ]Severe  Constipation:                    [ ]Mild [ ]Moderate [ ]Severe    PAINAD Score:    [email or web address]/[email or web address].pdf (Ctrl +  left click to view)  		  Other Symptoms:  [ ]All other review of systems negative     Karnofsky Performance Score/Palliative Performance Status Version 2:       20 %  PHYSICAL EXAM:  Vital Signs Last 24 Hrs  T(C): 36.6 (09 Apr 2019 08:21), Max: 36.8 (08 Apr 2019 20:01)  T(F): 97.9 (09 Apr 2019 08:21), Max: 98.2 (08 Apr 2019 20:01)  HR: 78 (09 Apr 2019 08:21) (78 - 78)  BP: 138/76 (09 Apr 2019 08:21) (138/76 - 138/76)  BP(mean): --  RR: 18 (09 Apr 2019 08:21) (18 - 18)  SpO2: 96% (09 Apr 2019 08:21) (96% - 96%)    GENERAL:  [ ]Alert  [ ]Oriented   [ x]Lethargic  [ ]Cachexia  [ ]Unarousable  [ ]Verbal  [X ]Non-Verbal  Behavioral:   [ ] Anxiety  [ ] Delirium [X ] Agitation [ ] Other  HEENT:  [ ]Normal   [ x]Dry mouth   [ ]ET Tube/Trach  [ ]Oral lesions  PULMONARY:   [x ]Clear [ ]Tachypnea  [ ]Audible excessive secretions   [ ]Rhonchi        [ ]Right [ ]Left [ ]Bilateral  [ ]Crackles        [ ]Right [ ]Left [ ]Bilateral  [ ]Wheezing     [ ]Right [ ]Left [ ]Bilateral  CARDIOVASCULAR:    [x ]Regular [ ]Irregular [ ]Tachy  [ ]Amado [ ]Murmur [ ]Other +S1 +S2   GASTROINTESTINAL:  [x ]Soft  [ ]Distended   [ x]+BS  [ x]Non tender [ ]Tender  [ ]PEG [ ]OGT/ NGT Last BM: 4/9/19 (s/p suppository)  GENITOURINARY:  [ ]Normal [x ] Incontinent   [ ]Oliguria/Anuria   [ ]Guadalupe  MUSCULOSKELETAL:   [ ]Normal   [x ]Weakness  [ ]Bed/Wheelchair bound [ ]Edema  NEUROLOGIC:   [ ]No focal deficits  [x ] Cognitive impairment  [ ] Dysphagia [ ]Dysarthria [ ] Paresis [ ]Other   SKIN: ecchymoses  [ ]Normal   [ ]Pressure ulcer(s)  [X ]Rash    CRITICAL CARE:  [ ] Shock Present  [ ]Septic [ ]Cardiogenic [ ]Neurologic [ ]Hypovolemic  [ ]  Vasopressors [ ]  Inotropes   [ ] Respiratory failure present  [ ] Acute  [ ] Chronic [ ] Hypoxic  [ ] Hypercarbic [ ] Other  [ ] Other organ failure     LABS: no new lab    RADIOLOGY & ADDITIONAL STUDIES:      PROTEIN CALORIE MALNUTRITION:   [x ] PPSV2 < or = 30% [ ] significant weight loss [ ] poor nutritional intake [ ] anasarca [ ] catabolic state   Albumin, Serum: 2.3 g/dL (04-03-19 @ 06:48)   Artificial Nutrition [ ]     REFERRALS:   [ ]Chaplaincy  [x ] Hospice  [ ]Child Life  [ ]Social Work  [ ]Case management [ ]Holistic Therapy [ ] Physical Therapy [ ] Dietary   Goals of Care Document: Goals of Care Conversation - Personal Advance Directive [LYNDA Quispe] (04-05-19 @ 15:13)

## 2019-04-10 NOTE — PROGRESS NOTE ADULT - PROBLEM SELECTOR PLAN 7
Patient transferred to PCU for management of symptoms. Significant decline in mental status. Family requesting full comfort approach. Symptoms being managed. No further work-up or aggressive interventions. MOLST present in chart and reflective of wishes for patient.    Working with HCN for possible transfer to inpatient facility.

## 2019-04-10 NOTE — PROGRESS NOTE ADULT - ASSESSMENT
89 yo female with dementia presenting with decreased activity and PO for 1 day. Lives in independent living home, was brought in for decreased communication, PO, and energy for 1 day prior to hospital admission. Patient found to have large pleural effusion s/p chest tube and subsequent removal as family does not want to pursue aggressive interventions. Palliative care consulted for GOC discussion and patient was transferred to PCU for comfort care.     Lorazepam used x 3 prn and Dilaudid ATC remains in place. Dilaudid x 1 given prn for pain and x 1 given for dyspnea. Patient appears more comfortable this AM. In touch with HCN for possible inpatient transfer.

## 2019-04-10 NOTE — PROGRESS NOTE ADULT - PROBLEM SELECTOR PLAN 1
-Morphine 0.5mg IV Q6 PRN  -c/w morphine 0.5mg IV q1h prn for sob/pain.  -PRN used x 2 in past 24 hours. (1 for dyspnea and 1 for pain)  - Appears to be well controlled with ATC medication. -Morphine 0.5mg IV Q6 PRN  -c/w morphine 0.5mg IV q1h prn for sob/pain.  -PRN used x 2 in past 24 hours. (1 for dyspnea and 1 for pain)  - Appears to be controlled with ATC medication.

## 2019-04-10 NOTE — PROGRESS NOTE ADULT - PROBLEM SELECTOR PLAN 6
previously at [facility], quite functional at baseline prior to this hospitalization. previously at [facility], quite functional at baseline prior to this hospitalization, now totally dependent for all ADL's.

## 2019-04-11 ENCOUNTER — TRANSCRIPTION ENCOUNTER (OUTPATIENT)
Age: 84
End: 2019-04-11

## 2019-04-11 VITALS
SYSTOLIC BLOOD PRESSURE: 126 MMHG | RESPIRATION RATE: 18 BRPM | TEMPERATURE: 98 F | HEART RATE: 83 BPM | DIASTOLIC BLOOD PRESSURE: 67 MMHG | OXYGEN SATURATION: 97 %

## 2019-04-11 PROCEDURE — 89051 BODY FLUID CELL COUNT: CPT

## 2019-04-11 PROCEDURE — 82962 GLUCOSE BLOOD TEST: CPT

## 2019-04-11 PROCEDURE — C1729: CPT

## 2019-04-11 PROCEDURE — 87798 DETECT AGENT NOS DNA AMP: CPT

## 2019-04-11 PROCEDURE — 84295 ASSAY OF SERUM SODIUM: CPT

## 2019-04-11 PROCEDURE — 84132 ASSAY OF SERUM POTASSIUM: CPT

## 2019-04-11 PROCEDURE — 82042 OTHER SOURCE ALBUMIN QUAN EA: CPT

## 2019-04-11 PROCEDURE — 84443 ASSAY THYROID STIM HORMONE: CPT

## 2019-04-11 PROCEDURE — 87040 BLOOD CULTURE FOR BACTERIA: CPT

## 2019-04-11 PROCEDURE — 82803 BLOOD GASES ANY COMBINATION: CPT

## 2019-04-11 PROCEDURE — 86850 RBC ANTIBODY SCREEN: CPT

## 2019-04-11 PROCEDURE — 99291 CRITICAL CARE FIRST HOUR: CPT | Mod: 25

## 2019-04-11 PROCEDURE — 70450 CT HEAD/BRAIN W/O DYE: CPT

## 2019-04-11 PROCEDURE — 87015 SPECIMEN INFECT AGNT CONCNTJ: CPT

## 2019-04-11 PROCEDURE — 87206 SMEAR FLUORESCENT/ACID STAI: CPT

## 2019-04-11 PROCEDURE — 85730 THROMBOPLASTIN TIME PARTIAL: CPT

## 2019-04-11 PROCEDURE — 87116 MYCOBACTERIA CULTURE: CPT

## 2019-04-11 PROCEDURE — 93306 TTE W/DOPPLER COMPLETE: CPT

## 2019-04-11 PROCEDURE — 32556 INSERT CATH PLEURA W/O IMAGE: CPT

## 2019-04-11 PROCEDURE — 80053 COMPREHEN METABOLIC PANEL: CPT

## 2019-04-11 PROCEDURE — 82550 ASSAY OF CK (CPK): CPT

## 2019-04-11 PROCEDURE — 86900 BLOOD TYPING SEROLOGIC ABO: CPT

## 2019-04-11 PROCEDURE — 81001 URINALYSIS AUTO W/SCOPE: CPT

## 2019-04-11 PROCEDURE — 82553 CREATINE MB FRACTION: CPT

## 2019-04-11 PROCEDURE — 71250 CT THORAX DX C-: CPT

## 2019-04-11 PROCEDURE — 83615 LACTATE (LD) (LDH) ENZYME: CPT

## 2019-04-11 PROCEDURE — 84157 ASSAY OF PROTEIN OTHER: CPT

## 2019-04-11 PROCEDURE — 87486 CHLMYD PNEUM DNA AMP PROBE: CPT

## 2019-04-11 PROCEDURE — 96374 THER/PROPH/DIAG INJ IV PUSH: CPT | Mod: XU

## 2019-04-11 PROCEDURE — 96375 TX/PRO/DX INJ NEW DRUG ADDON: CPT | Mod: XU

## 2019-04-11 PROCEDURE — 83986 ASSAY PH BODY FLUID NOS: CPT

## 2019-04-11 PROCEDURE — 82330 ASSAY OF CALCIUM: CPT

## 2019-04-11 PROCEDURE — 85610 PROTHROMBIN TIME: CPT

## 2019-04-11 PROCEDURE — 85014 HEMATOCRIT: CPT

## 2019-04-11 PROCEDURE — 82945 GLUCOSE OTHER FLUID: CPT

## 2019-04-11 PROCEDURE — 82947 ASSAY GLUCOSE BLOOD QUANT: CPT

## 2019-04-11 PROCEDURE — 87070 CULTURE OTHR SPECIMN AEROBIC: CPT

## 2019-04-11 PROCEDURE — 82435 ASSAY OF BLOOD CHLORIDE: CPT

## 2019-04-11 PROCEDURE — 87205 SMEAR GRAM STAIN: CPT

## 2019-04-11 PROCEDURE — 93005 ELECTROCARDIOGRAM TRACING: CPT | Mod: XU

## 2019-04-11 PROCEDURE — 83605 ASSAY OF LACTIC ACID: CPT

## 2019-04-11 PROCEDURE — 86901 BLOOD TYPING SEROLOGIC RH(D): CPT

## 2019-04-11 PROCEDURE — 87075 CULTR BACTERIA EXCEPT BLOOD: CPT

## 2019-04-11 PROCEDURE — 87581 M.PNEUMON DNA AMP PROBE: CPT

## 2019-04-11 PROCEDURE — 99233 SBSQ HOSP IP/OBS HIGH 50: CPT | Mod: GC

## 2019-04-11 PROCEDURE — 87633 RESP VIRUS 12-25 TARGETS: CPT

## 2019-04-11 PROCEDURE — 87086 URINE CULTURE/COLONY COUNT: CPT

## 2019-04-11 PROCEDURE — 71045 X-RAY EXAM CHEST 1 VIEW: CPT

## 2019-04-11 PROCEDURE — 85027 COMPLETE CBC AUTOMATED: CPT

## 2019-04-11 PROCEDURE — 84484 ASSAY OF TROPONIN QUANT: CPT

## 2019-04-11 RX ORDER — ACETAMINOPHEN 500 MG
1 TABLET ORAL
Qty: 0 | Refills: 0 | COMMUNITY
Start: 2019-04-11

## 2019-04-11 RX ORDER — MORPHINE SULFATE 50 MG/1
0.5 CAPSULE, EXTENDED RELEASE ORAL
Qty: 0 | Refills: 0 | COMMUNITY
Start: 2019-04-11

## 2019-04-11 RX ORDER — PREGABALIN 225 MG/1
1 CAPSULE ORAL
Qty: 0 | Refills: 0 | COMMUNITY

## 2019-04-11 RX ADMIN — Medication 0.5 MILLIGRAM(S): at 02:31

## 2019-04-11 RX ADMIN — MORPHINE SULFATE 0.5 MILLIGRAM(S): 50 CAPSULE, EXTENDED RELEASE ORAL at 00:03

## 2019-04-11 RX ADMIN — MORPHINE SULFATE 0.5 MILLIGRAM(S): 50 CAPSULE, EXTENDED RELEASE ORAL at 12:53

## 2019-04-11 RX ADMIN — Medication 0.5 MILLIGRAM(S): at 05:36

## 2019-04-11 RX ADMIN — Medication 0.5 MILLIGRAM(S): at 14:19

## 2019-04-11 RX ADMIN — MORPHINE SULFATE 0.5 MILLIGRAM(S): 50 CAPSULE, EXTENDED RELEASE ORAL at 05:35

## 2019-04-11 RX ADMIN — Medication 0.5 MILLIGRAM(S): at 10:27

## 2019-04-11 NOTE — PROGRESS NOTE ADULT - PROBLEM SELECTOR PLAN 7
Patient transferred to PCU for management of symptoms. Significant decline in mental status. Family requesting full comfort approach. Symptoms being managed. No further work-up or aggressive interventions. MOLST present in chart and reflective of wishes for patient.    Working with HCN for possible transfer to inpatient facility today. Daughter visiting [facility] and will be in touch with HCN nurse today for potential transfer.

## 2019-04-11 NOTE — PROGRESS NOTE ADULT - ATTENDING COMMENTS
I was physically present for the key portions of the evaluation and management (E/M) service provided.  I agree with the above history, physical, and plan which I have reviewed and edited where appropriate. Plan discussed with team.
I have personally seen and examined this patient and agree with the above assessment and plan, which I have reviewed and edited where appropriate.
I was physically present for the key portions of the evaluation and management (E/M) service provided.  I agree with the above history, physical, and plan which I have reviewed and edited where appropriate. Plan discussed with team.
I have personally seen and examined this patient and agree with the above assessment and plan, which I have reviewed and edited where appropriate.   HCN to meet with daughter today regarding transfer to in care.
I have personally seen and examined this patient and agree with the above assessment and plan, which I have reviewed and edited where appropriate.   Hospice DC plan
I have personally seen and examined this patient and agree with the above assessment and plan, which I have reviewed and edited where appropriate.   Awaiting bed for inpatient hospice.

## 2019-04-11 NOTE — PROGRESS NOTE ADULT - REASON FOR ADMISSION
rapid a,fib

## 2019-04-11 NOTE — PROGRESS NOTE ADULT - PROBLEM SELECTOR PLAN 1
-Morphine 0.5mg IV Q6 PRN  -c/w morphine 0.5mg IV q1h prn for sob/pain.  - No prn used in past 24 hours  - Appears to be controlled with ATC medication.

## 2019-04-11 NOTE — PROGRESS NOTE ADULT - PROBLEM SELECTOR PLAN 2
No further aggressive diagnostic or therapeutic measure as per daughter. Manage and monitor symptoms. May be terminal agitation or secondary to dementia. Lorazepam being used prn with good effect. Patient now on ATC Lorazepam with good effect.

## 2019-04-11 NOTE — PROGRESS NOTE ADULT - PROBLEM SELECTOR PLAN 4
May be terminal agitation or secondary to dementia. Lorazepam being used prn with good effect. Patient now on ATC Lorazepam with good effect. Fast 7e, PPSV@ 10%.

## 2019-04-11 NOTE — PROGRESS NOTE ADULT - PROBLEM SELECTOR PLAN 5
BM on 4/10/19. Will continue to monitor. Dulcolax prn. Patient transferred to PCU for management of symptoms. Significant decline in mental status. Family requesting full comfort approach. Symptoms being managed. No further work-up or aggressive interventions. MOLST present in chart and reflective of wishes for patient.    Working with HCN for possible transfer to inpatient facility today. Daughter visiting [facility] and will be in touch with HCN nurse today for potential transfer.

## 2019-04-11 NOTE — PROGRESS NOTE ADULT - PROBLEM SELECTOR PROBLEM 1
Shortness of breath
Pleural effusion
Shortness of breath

## 2019-04-11 NOTE — PROGRESS NOTE ADULT - PROBLEM SELECTOR PLAN 3
Digoxin and ASA were stopped as per wishes of daughter. Patient no longer able to take po. BM on 4/10/19. Will continue to monitor. Dulcolax prn.

## 2019-04-11 NOTE — DISCHARGE NOTE NURSING/CASE MANAGEMENT/SOCIAL WORK - NSDCCRNAME_GEN_ALL_CORE_FT
[facility]   Inpatient Hospice at [facility] ([address] [place] [id number])

## 2019-04-11 NOTE — PROGRESS NOTE ADULT - PROBLEM SELECTOR PLAN 6
previously at [facility], quite functional at baseline prior to this hospitalization, now totally dependent for all ADL's and minimally responsive.

## 2019-04-11 NOTE — DISCHARGE NOTE NURSING/CASE MANAGEMENT/SOCIAL WORK - NSDCDPATPORTLINK_GEN_ALL_CORE
You can access the [facility], offered by [facility], by registering with the following website: [email or web address]

## 2019-04-11 NOTE — PROGRESS NOTE ADULT - PROVIDER SPECIALTY LIST ADULT
Cardiology
Internal Medicine
Internal Medicine
Palliative Care
Thoracic Surgery
Palliative Care

## 2019-04-11 NOTE — DISCHARGE NOTE PROVIDER - HOSPITAL COURSE
89 yo female with dementia presenting with decreased activity and PO for 1 day. Lives in independent living home, was brought in for decreased communication, PO, and energy for 1 day. family feels that she is less interactive. Denies any pain. brought in with Afib with RVR and hypotensive.    Patient received chest tube with significant effusion and in rapid a.fib and hypotension.        Last weekend, family requested that full comfort support be ensued for the patient and patient was transferred to the PCU for management of agitation and pain. Patient with altered mental status and now has progressed to minimally responsive. Patient receiving around the clock Lorazepam and Morphine for management of agitation and pain.        Plan for transfer to [facility] for inpatient hospice for continued management of symptoms and end of life care.

## 2019-04-11 NOTE — DISCHARGE NOTE PROVIDER - CARE PROVIDER_API CALL
Vivek [facility])  [facility] Medicine; Internal Medicine  [address] [place] [address], [address] [id number]  Phone: [phone]  Fax: [phone]  Follow Up Time:

## 2019-04-11 NOTE — DISCHARGE NOTE PROVIDER - NSDCCPCAREPLAN_GEN_ALL_CORE_FT
PRINCIPAL DISCHARGE DIAGNOSIS  Diagnosis: Atrial fibrillation with RVR  Assessment and Plan of Treatment:       SECONDARY DISCHARGE DIAGNOSES  Diagnosis: Pleural effusion  Assessment and Plan of Treatment:     Diagnosis: Hypotension  Assessment and Plan of Treatment:

## 2019-04-11 NOTE — PROGRESS NOTE ADULT - PROBLEM SELECTOR PROBLEM 6
Palliative care encounter
Dementia without behavioral disturbance, unspecified dementia type
Palliative care encounter

## 2019-04-11 NOTE — PROGRESS NOTE ADULT - ASSESSMENT
89 yo female with dementia presenting with decreased activity and PO for 1 day. Lives in independent living home, was brought in for decreased communication, PO, and energy for 1 day prior to hospital admission. Patient found to have large pleural effusion s/p chest tube and subsequent removal as family does not want to pursue aggressive interventions. Palliative care consulted for GOC discussion and patient was transferred to PCU for comfort care.     Patient with Lorazepam and Dilaudid ATC with good effect. Agitation improved. Awaiting possible transfer to inpatient hospice. Patient minimally responsive to verbal and tactile stimuli.

## 2019-04-11 NOTE — PROGRESS NOTE ADULT - SUBJECTIVE AND OBJECTIVE BOX
GAP TEAM PALLIATIVE CARE UNIT PROGRESS NOTE:     [  ] Patient on hospice program.    INDICATION FOR PALLIATIVE CARE UNIT SERVICES: pain, dyspnea in the setting of end stage dementia with AFTT    INTERVAL HPI/OVERNIGHT EVENTS: Patient on ATC Morphine and ATC Lorazepam. No prn used since initiation of ATC Lorazepam. Patient appears comfortable this AM. Minimally responsive. Awaiting daughter to visit [facility] and then potential transfer to the [facility] today.    DNR on chart: Yes    Allergies    penicillin G benzathine (Unknown)    Intolerances    MEDICATIONS  (STANDING):  LORazepam   Injectable 0.5 milliGRAM(s) IV Push every 4 hours  morphine  - Injectable 0.5 milliGRAM(s) IV Push every 6 hours    MEDICATIONS  (PRN):  acetaminophen  Suppository .. 650 milliGRAM(s) Rectal every 6 hours PRN Temp greater or equal to 38C (100.4F)  bisacodyl Suppository 10 milliGRAM(s) Rectal daily PRN Constipation  LORazepam   Injectable 0.5 milliGRAM(s) IV Push every 1 hour PRN anxiety/agitation  morphine  - Injectable 0.5 milliGRAM(s) IV Push every 1 hour PRN shortness of breath  morphine  - Injectable 0.5 milliGRAM(s) IV Push every 1 hour PRN pain/discomfort      ITEMS UNCHECKED ARE NOT PRESENT    PRESENT SYMPTOMS: [x ]Unable to obtain due to poor mentation   Source if other than patient:  [ ]Family   [ ]Team     Pain (Impact on QOL):  denies pain   Location:       Minimal acceptable level (0-10 scale):                    Aggrevating factors:  Quality:  Radiation:  Severity (0-10 scale):     Dyspnea:                           [ ]Mild [X ]Moderate [ ]Severe  Anxiety:                             [ ]Mild [X ]Moderate [ ]Severe  Fatigue:                             [ ]Mild [ ]Moderate [ ]Severe  Nausea:                             [ ]Mild [ ]Moderate [ ]Severe  Loss of appetite:              [ ]Mild [X ]Moderate [ ]Severe  Constipation:                    [ ]Mild [ ]Moderate [ ]Severe    PAINAD Score:    [email or web address]/cog/painad.pdf (Ctrl +  left click to view)  		  Other Symptoms:  [ ]All other review of systems negative     Karnofsky Performance Score/Palliative Performance Status Version 2:       20 %  PHYSICAL EXAM:  Vital Signs Last 24 Hrs  T(C): 36.4 (11 Apr 2019 08:41), Max: 36.4 (11 Apr 2019 08:41)  T(F): 97.5 (11 Apr 2019 08:41), Max: 97.5 (11 Apr 2019 08:41)  HR: 83 (11 Apr 2019 08:41) (83 - 83)  BP: 131/70 (11 Apr 2019 08:41) (131/70 - 131/70)  BP(mean): --  RR: 18 (11 Apr 2019 08:41) (18 - 18)  SpO2: 97% (11 Apr 2019 08:41) (97% - 97%)    GENERAL:  [ ]Alert  [ ]Oriented   [ x]Lethargic  [ ]Cachexia  [ ]Unarousable  [ ]Verbal  [X ]Non-Verbal  Behavioral:   [ ] Anxiety  [ ] Delirium [X ] Agitation [ ] Other  HEENT:  [ ]Normal   [ x]Dry mouth   [ ]ET Tube/Trach  [ ]Oral lesions  PULMONARY:   [x ]Clear [ ]Tachypnea  [ ]Audible excessive secretions   [ ]Rhonchi        [ ]Right [ ]Left [ ]Bilateral  [ ]Crackles        [ ]Right [ ]Left [ ]Bilateral  [ ]Wheezing     [ ]Right [ ]Left [ ]Bilateral  CARDIOVASCULAR:    [x ]Regular [ ]Irregular [ ]Tachy  [ ]Amado [ ]Murmur [ ]Other +S1 +S2   GASTROINTESTINAL:  [x ]Soft  [ ]Distended   [ x]+BS  [ x]Non tender [ ]Tender  [ ]PEG [ ]OGT/ NGT Last BM: 4/10/19 (s/p suppository)  GENITOURINARY:  [ ]Normal [x ] Incontinent   [ ]Oliguria/Anuria   [ ]Guadalupe  MUSCULOSKELETAL:   [ ]Normal   [x ]Weakness  [ ]Bed/Wheelchair bound [ ]Edema  NEUROLOGIC:   [ ]No focal deficits  [x ] Cognitive impairment  [ ] Dysphagia [ ]Dysarthria [ ] Paresis [ ]Other   SKIN: ecchymoses  [ ]Normal   [ ]Pressure ulcer(s)  [X ]Rash    CRITICAL CARE:  [ ] Shock Present  [ ]Septic [ ]Cardiogenic [ ]Neurologic [ ]Hypovolemic  [ ]  Vasopressors [ ]  Inotropes   [ ] Respiratory failure present  [ ] Acute  [ ] Chronic [ ] Hypoxic  [ ] Hypercarbic [ ] Other  [ ] Other organ failure     LABS: no new lab    RADIOLOGY & ADDITIONAL STUDIES:      PROTEIN CALORIE MALNUTRITION:   [x ] PPSV2 < or = 30% [ ] significant weight loss [ ] poor nutritional intake [ ] anasarca [ ] catabolic state   Albumin, Serum: 2.3 g/dL (04-03-19 @ 06:48)   Artificial Nutrition [ ]     REFERRALS:   [ ]Chaplaincy  [x ] Hospice  [ ]Child Life  [ ]Social Work  [ ]Case management [ ]Holistic Therapy [ ] Physical Therapy [ ] Dietary   Goals of Care Document: Goals of Care Conversation - Personal Advance Directive [LYNDA Quispe] (04-05-19 @ 15:13)

## 2019-05-22 LAB
CULTURE RESULTS: SIGNIFICANT CHANGE UP
SPECIMEN SOURCE: SIGNIFICANT CHANGE UP

## 2019-10-16 NOTE — ED PROVIDER NOTE - CARE PLAN
No
Principal Discharge DX:	Subarachnoid bleed  Instructions for follow-up, activity and diet:	Follow up with your Primary Care Physician within the next 2-3 days  Bring a copy of your test results with you to your appointment  Continue your current medication regimen  Return to the Emergency Room if you experience new or worsening symptoms  Follow up with Neurosurgery Dr Stone [phone]  return to the ED for suture removal in 5 days  Follow up with orthopedics regarding your humeral head fracture  Dr Elias [phone]  Secondary Diagnosis:	Humeral head fracture

## 2019-12-11 NOTE — PROGRESS NOTE ADULT - SUBJECTIVE AND OBJECTIVE BOX
Authored by Endy Lugo, DO: Beeper#[phone]    REYNA CLAUDIO  89y  Female      Patient is a 89y old  Female who presents with a chief complaint of Fall and multiple injuries (16 Oct 2017 00:18)      INTERVAL HPI/OVERNIGHT EVENTS:  Per RN, patient slept well, did not sundown.  Patietn offers no complaints today.  Denies any other complaints.     Vital Signs Last 24 Hrs  T(C): 36.4 (20 Oct 2017 06:12), Max: 37 (19 Oct 2017 20:37)  T(F): 97.6 (20 Oct 2017 06:12), Max: 98.6 (19 Oct 2017 20:37)  HR: 87 (20 Oct 2017 06:12) (80 - 87)  BP: 117/56 (20 Oct 2017 06:12) (100/60 - 117/56)  BP(mean): --  RR: 18 (20 Oct 2017 06:12) (18 - 18)  SpO2: 98% (20 Oct 2017 06:12) (96% - 98%)    PHYSICAL EXAM:  GENERAL: NAD  HEENT: right eye soft tissue swelled shut with right sided facial swelling and ecchymosis, slightly improved..  EOMI   CARDS: +S1S2. rrr.  No m/r/g  RESP: CTA b/l.  No r/r/w  ABD: Soft.  NT/ND.  +BS  EXT: No c/c/e.  movign b/l upper extemiteis spontaneously.   SKIN : no flank or thigh ecchymosis  Psych: AAOx1	    Consultant(s) Notes Reviewed:  [x ] YES  [ ] NO  Care Discussed with Consultants/Other Providers [ x] YES  [ ] NO:   LABS:                                              8.6    6.0   )-----------( 174      ( 19 Oct 2017 05:39 )             25.3   10-19    137  |  103  |  9   ----------------------------<  103<H>  3.8   |  22  |  0.58    Ca    8.4      19 Oct 2017 05:39  Phos  2.7     10-19            RADIOLOGY & ADDITIONAL TESTS: negative

## 2022-11-01 NOTE — ED PROVIDER NOTE - CPE EDP SKIN NORM
Azathioprine Counseling:  I discussed with the patient the risks of azathioprine including but not limited to myelosuppression, immunosuppression, hepatotoxicity, lymphoma, and infections.  The patient understands that monitoring is required including baseline LFTs, Creatinine, possible TPMP genotyping and weekly CBCs for the first month and then every 2 weeks thereafter.  The patient verbalized understanding of the proper use and possible adverse effects of azathioprine.  All of the patient's questions and concerns were addressed. normal...

## 2023-08-04 NOTE — ED PROCEDURE NOTE - CPROC ED SITE PREP1
Get labs fasting  Get x-ray of left knee  Heating pad as needed for back  Referred for Physical therapy  Referred to eye doctor  Referred to ENT  Can use flonase as needed     normal saline

## 2025-05-15 NOTE — PROGRESS NOTE ADULT - PROBLEM SELECTOR PLAN 3
Digoxin and ASA were stopped as per wishes of daughter. Patient no longer able to take po. transfer from [facility] s/p mechanical trip and fall at ~430pm today, landing on chin. pt found to have  +mandibular fx, sutures noted to chin. no active bleeding noted. denies active pain on assessment. denies blood thinner use, LOC. denies h/a, dizziness, vision changes, fevers/chills, gu sx, cp, sob, abd pain, n/v, diarrhea, constipation. 20gIV noted to [facility]. denies significant past medical hx.